# Patient Record
Sex: FEMALE | Race: ASIAN | NOT HISPANIC OR LATINO | Employment: FULL TIME | URBAN - METROPOLITAN AREA
[De-identification: names, ages, dates, MRNs, and addresses within clinical notes are randomized per-mention and may not be internally consistent; named-entity substitution may affect disease eponyms.]

---

## 2017-01-09 ENCOUNTER — GYNECOLOGY VISIT (OUTPATIENT)
Dept: OBGYN | Facility: MEDICAL CENTER | Age: 27
End: 2017-01-09
Payer: COMMERCIAL

## 2017-01-09 VITALS — WEIGHT: 201 LBS | BODY MASS INDEX: 40.52 KG/M2 | HEIGHT: 59 IN

## 2017-01-09 DIAGNOSIS — N93.8 DUB (DYSFUNCTIONAL UTERINE BLEEDING): ICD-10-CM

## 2017-01-09 LAB — IN CLINIC OB SCAN: NORMAL

## 2017-01-09 PROCEDURE — 99203 OFFICE O/P NEW LOW 30 MIN: CPT | Mod: 25 | Performed by: OBSTETRICS & GYNECOLOGY

## 2017-01-09 PROCEDURE — 76830 TRANSVAGINAL US NON-OB: CPT | Performed by: OBSTETRICS & GYNECOLOGY

## 2017-01-09 NOTE — MR AVS SNAPSHOT
"Nicole Turner   2017 10:00 AM   Gynecology Visit   MRN: [id number]    Department:  [facility]   Dept Phone:  [phone]    Description:  Female : 1990   Provider:  Hafsa Charles M.D.           Reason for Visit     Other DUB      Allergies as of 2017     No Known Allergies      Vital Signs     Height Weight Body Mass Index Last Menstrual Period Breastfeeding? Smoking Status    1.499 m (4' 11\") 91.173 kg (201 lb) 40.58 kg/m2 10/31/2016 Unknown Former Smoker      Basic Information     Date Of Birth Sex Race Ethnicity Preferred Language    1990 Female Unknown Non- English      Your appointments     2017 11:45 AM   New OB with Hafsa Charles M.D.   [facility]    [address]  [facility] [id number] [phone]              Health Maintenance        Date Due Completion Dates    IMM HEP B VACCINE (1 of 3 - Primary Series) 1990 ---    IMM HEP A VACCINE (1 of 2 - Standard Series) 10/2/1992 ---    IMM HPV VACCINE (1 of 3 - Female 3 Dose Series) 10/2/2002 ---    IMM VARICELLA (CHICKENPOX) VACCINE (1 of 2 - 2 Dose Adolescent Series) 10/2/2004 ---    IMM DTaP/Tdap/Td Vaccine (1 - Tdap) 10/2/2010 ---    PAP SMEAR 10/2/2012 ---    IMM INFLUENZA (1) 2016 10/26/2015            Current Immunizations     Influenza Vaccine Quad Inj (Preserved) 10/26/2015      Below and/or attached are the medications your provider expects you to take. Review all of your home medications and newly ordered medications with your provider and/or pharmacist. Follow medication instructions as directed by your provider and/or pharmacist. Please keep your medication list with you and share with your provider. Update the information when medications are discontinued, doses are changed, or new medications (including over-the-counter products) are added; and carry medication information at all times in the event of " emergency situations     Allergies:  No Known Allergies          Medications  Valid as of: January 09, 2017 - 10:18 AM    Generic Name Brand Name Tablet Size Instructions for use    Metoclopramide HCl (Tab) REGLAN 10 MG Take 1 Tab by mouth every 6 hours as needed (nausea).        .                 Medicines prescribed today were sent to:     None      Medication refill instructions:       If your prescription bottle indicates you have medication refills left, it is not necessary to call your provider’s office. Please contact your pharmacy and they will refill your medication.    If your prescription bottle indicates you do not have any refills left, you may request refills at any time through one of the following ways: The online [employer] system (except Urgent Care), by calling your provider’s office, or by asking your pharmacy to contact your provider’s office with a refill request. Medication refills are processed only during regular business hours and may not be available until the next business day. Your provider may request additional information or to have a follow-up visit with you prior to refilling your medication.   *Please Note: Medication refills are assigned a new Rx number when refilled electronically. Your pharmacy may indicate that no refills were authorized even though a new prescription for the same medication is available at the pharmacy. Please request the medicine by name with the pharmacy before contacting your provider for a refill.           [employer] Access Code: Activation code not generated  Current [employer] Status: Active

## 2017-01-09 NOTE — PROGRESS NOTES
"Chief Complaint   Patient presents with   • Other     DUB     History of present illness:   26 y.o.  10 weeks by LMP presents for confirmation of pregnancy  Some nausea, no vomiting  No vaginal bleeding, no cramping    Review of systems:  Pertinent positives documented in HPI and all other systems reviewed & are negative    All PMH, PSH, allergies, social history and FH reviewed and updated today:  Past Medical History   Diagnosis Date   • Migraine    • H/O bladder infections 2016       History reviewed. No pertinent past surgical history.    Allergies: No Known Allergies    Social History     Social History   • Marital Status: Single     Spouse Name: N/A   • Number of Children: N/A   • Years of Education: N/A     Occupational History   • Not on file.     Social History Main Topics   • Smoking status: Former Smoker   • Smokeless tobacco: Not on file   • Alcohol Use: No   • Drug Use: No   • Sexual Activity:     Partners: Male     Birth Control/ Protection: Condom     Other Topics Concern   • Not on file     Social History Narrative       Family History   Problem Relation Age of Onset   • Hypertension Mother    • Diabetes Mother    • Other Mother      gout   • Hypertension Father    • Diabetes Father    • Other Father      gout   • Heart Disease Maternal Grandmother      Physical exam:  Height 1.499 m (4' 11\"), weight 91.173 kg (201 lb), last menstrual period 10/31/2016, unknown if currently breastfeeding.    General:appears stated age, is in no apparent distress, is well developed and well nourished  Skin: No rashes, or ulcers or lesions seen  Psychiatric: Patient shows appropriate affect, is alert and oriented x3, intact judgment and insight.    1. DUB (dysfunctional uterine bleeding)  POCT IN CLINIC OB SCAN   2.  As per my read: TVS shows a single live IUP 10 weeks by CRL (+) cardiac activity, YS seen. ART 2017. normal cervix, normal ovaries. no FF in the CDS  3. Continue PNV   4. Nutrition in pregnancy " discussed. N/V  5. Hydrate  6. SAB precautions  7. 1st trimester screening with Dr Ybarra  8. New OB 4 weeks

## 2017-01-31 ENCOUNTER — HOSPITAL ENCOUNTER (OUTPATIENT)
Dept: LAB | Facility: MEDICAL CENTER | Age: 27
End: 2017-01-31
Attending: OBSTETRICS & GYNECOLOGY
Payer: COMMERCIAL

## 2017-01-31 LAB
ABO GROUP BLD: NORMAL
APPEARANCE UR: ABNORMAL
BACTERIA #/AREA URNS HPF: ABNORMAL /HPF
BASOPHILS # BLD AUTO: 0.05 K/UL (ref 0–0.12)
BASOPHILS NFR BLD AUTO: 0.5 % (ref 0–1.8)
BILIRUB UR QL STRIP.AUTO: NEGATIVE
BLD GP AB SCN SERPL QL: NORMAL
CA OXALATE CRYSTAL  1765: ABNORMAL /HPF
COLOR UR AUTO: YELLOW
CULTURE IF INDICATED INDCX: YES UA CULTURE
EOSINOPHIL # BLD: 0.06 K/UL (ref 0–0.51)
EOSINOPHIL NFR BLD AUTO: 0.6 % (ref 0–6.9)
EPITHELIAL CELLS 1715: ABNORMAL /HPF
ERYTHROCYTE [DISTWIDTH] IN BLOOD BY AUTOMATED COUNT: 47.3 FL (ref 35.9–50)
GLUCOSE UR STRIP.AUTO-MCNC: NEGATIVE MG/DL
HBV SURFACE AG SERPL QL IA: NEGATIVE
HCT VFR BLD AUTO: 41.6 % (ref 37–47)
HGB BLD-MCNC: 13.3 G/DL (ref 12–16)
HIV 1+2 AB+HIV1 P24 AG SERPL QL IA: NON REACTIVE
IMM GRANULOCYTES # BLD AUTO: 0.03 K/UL (ref 0–0.11)
IMM GRANULOCYTES NFR BLD AUTO: 0.3 % (ref 0–0.9)
KETONES UR STRIP.AUTO-MCNC: NEGATIVE MG/DL
LEUKOCYTE ESTERASE UR QL STRIP.AUTO: ABNORMAL
LYMPHOCYTES # BLD: 1.96 K/UL (ref 1–4.8)
LYMPHOCYTES NFR BLD AUTO: 18.4 % (ref 22–41)
MCH RBC QN AUTO: 29.8 PG (ref 27–33)
MCHC RBC AUTO-ENTMCNC: 32 G/DL (ref 33.6–35)
MCV RBC AUTO: 93.1 FL (ref 81.4–97.8)
MICRO URNS: ABNORMAL
MONOCYTES # BLD: 0.58 K/UL (ref 0–0.85)
MONOCYTES NFR BLD AUTO: 5.5 % (ref 0–13.4)
MUCOUS THREADS URNS QL MICRO: ABNORMAL /HPF
NEUTROPHILS # BLD: 7.96 K/UL (ref 2–7.15)
NEUTROPHILS NFR BLD AUTO: 74.7 % (ref 44–72)
NITRITE UR QL STRIP.AUTO: NEGATIVE
NRBC # BLD AUTO: 0 K/UL
NRBC BLD-RTO: 0 /100 WBC
PH UR: 6.5 [PH]
PLATELET # BLD AUTO: 359 K/UL (ref 164–446)
PMV BLD AUTO: 9.8 FL (ref 9–12.9)
PROT UR QL STRIP: NEGATIVE MG/DL
RBC # BLD AUTO: 4.47 M/UL (ref 4.2–5.4)
RBC #/AREA URNS HPF: ABNORMAL /HPF
RBC UR QL AUTO: NEGATIVE
RH BLD: NORMAL
RUBV IGG SERPL IA-ACNC: 284.5 IU/ML
SP GR UR STRIP.AUTO: 1.02
TREPONEMA PALLIDUM IGG+IGM AB [PRESENCE] IN SERUM OR PLASMA BY IMMUNOASSAY: NON REACTIVE
WBC # BLD AUTO: 10.6 K/UL (ref 4.8–10.8)
WBC #/AREA URNS HPF: ABNORMAL /HPF

## 2017-01-31 PROCEDURE — 87086 URINE CULTURE/COLONY COUNT: CPT

## 2017-01-31 PROCEDURE — 84702 CHORIONIC GONADOTROPIN TEST: CPT

## 2017-01-31 PROCEDURE — 85025 COMPLETE CBC W/AUTO DIFF WBC: CPT

## 2017-01-31 PROCEDURE — 81001 URINALYSIS AUTO W/SCOPE: CPT

## 2017-01-31 PROCEDURE — 86901 BLOOD TYPING SEROLOGIC RH(D): CPT

## 2017-01-31 PROCEDURE — 86900 BLOOD TYPING SEROLOGIC ABO: CPT

## 2017-01-31 PROCEDURE — 86780 TREPONEMA PALLIDUM: CPT

## 2017-01-31 PROCEDURE — 87389 HIV-1 AG W/HIV-1&-2 AB AG IA: CPT

## 2017-01-31 PROCEDURE — 86762 RUBELLA ANTIBODY: CPT

## 2017-01-31 PROCEDURE — 87340 HEPATITIS B SURFACE AG IA: CPT

## 2017-01-31 PROCEDURE — 86850 RBC ANTIBODY SCREEN: CPT

## 2017-01-31 PROCEDURE — 84163 PAPPA SERUM: CPT

## 2017-02-02 LAB
BACTERIA UR CULT: NORMAL
SIGNIFICANT IND 70042: NORMAL
SOURCE SOURCE: NORMAL

## 2017-02-03 ENCOUNTER — HOSPITAL ENCOUNTER (OUTPATIENT)
Facility: MEDICAL CENTER | Age: 27
End: 2017-02-03
Attending: OBSTETRICS & GYNECOLOGY
Payer: COMMERCIAL

## 2017-02-03 ENCOUNTER — INITIAL PRENATAL (OUTPATIENT)
Dept: OBGYN | Facility: MEDICAL CENTER | Age: 27
End: 2017-02-03
Payer: COMMERCIAL

## 2017-02-03 VITALS
WEIGHT: 198 LBS | HEIGHT: 59 IN | DIASTOLIC BLOOD PRESSURE: 76 MMHG | SYSTOLIC BLOOD PRESSURE: 100 MMHG | BODY MASS INDEX: 39.92 KG/M2

## 2017-02-03 DIAGNOSIS — Z34.01 ENCOUNTER FOR SUPERVISION OF NORMAL FIRST PREGNANCY IN FIRST TRIMESTER: ICD-10-CM

## 2017-02-03 DIAGNOSIS — R63.8 INCREASED BMI: ICD-10-CM

## 2017-02-03 PROBLEM — Z34.91 ENCOUNTER FOR SUPERVISION OF NORMAL PREGNANCY IN FIRST TRIMESTER: Status: ACTIVE | Noted: 2017-02-03

## 2017-02-03 LAB
# FETUSES US: 1
AGE AT DELIVERY: 25.9 YEARS
COMMENT  Z4564: NORMAL
FET CRL US.MEAS: 67 MM
FET NUCHAL FOLD MOM THICKNESS US.MEAS: 1.12
FET NUCHAL FOLD THICKNESS US.MEAS: 1.9 MM
FET TS 18 RISK FROM MAT AGE: NORMAL
FET TS 21 RISK FROM MAT AGE: NORMAL
GA: 13 WEEKS
HCG ADJ MOM SERPL: 1.08
HCG SERPL-ACNC: 76.5 IU/ML
INTEGRATED SCN PATIENT-IMP: NORMAL
NOTE Z4565: NORMAL
PAPP-A MOM SERPL: 0.64
PAPP-A SERPL-MCNC: 539.7 NG/ML
RESULTS Z4535: NORMAL
SONOGRAPHER: NORMAL
SUBMIT PART2 SAMPLE USING Z4537: NORMAL
TS 18 RISK FETUS: NORMAL
TS 21 RISK FETUS: NORMAL
US DATE: NORMAL

## 2017-02-03 PROCEDURE — 87491 CHLMYD TRACH DNA AMP PROBE: CPT

## 2017-02-03 PROCEDURE — 88175 CYTOPATH C/V AUTO FLUID REDO: CPT

## 2017-02-03 PROCEDURE — 59401 PR NEW OB VISIT: CPT | Performed by: OBSTETRICS & GYNECOLOGY

## 2017-02-03 PROCEDURE — 87591 N.GONORRHOEAE DNA AMP PROB: CPT

## 2017-02-03 NOTE — PROGRESS NOTES
CC: NEW OB VISIT    History of present illness:   26 y.o.  13W4D by LMP + 1st trimester US presents for New OB.  Doing well  No VB, no cramping  No nausea no vomiting    Review of systems:  Pertinent positives documented in HPI and all other systems reviewed & are negative    All PMH, PSH, allergies, social history and FH reviewed and updated today:  Past Medical History   Diagnosis Date   • Migraine    • H/O bladder infections 2016       No past surgical history on file.    Allergies: No Known Allergies    Social History     Social History   • Marital Status: Single     Spouse Name: N/A   • Number of Children: N/A   • Years of Education: N/A     Occupational History   • Not on file.     Social History Main Topics   • Smoking status: Former Smoker   • Smokeless tobacco: Not on file   • Alcohol Use: No   • Drug Use: No   • Sexual Activity:     Partners: Male     Birth Control/ Protection: Condom     Other Topics Concern   • Not on file     Social History Narrative       Family History   Problem Relation Age of Onset   • Hypertension Mother    • Diabetes Mother    • Other Mother      gout   • Hypertension Father    • Diabetes Father    • Other Father      gout   • Heart Disease Maternal Grandmother      Physical exam:  Last menstrual period 10/31/2016, unknown if currently breastfeeding.  SEE FULL PE  General:appears stated age, is in no apparent distress, is well developed and well nourished  Skin: No rashes, or ulcers or lesions seen  Psychiatric: Patient shows appropriate affect, is alert and oriented x3, intact judgment and insight.  1. Encounter for supervision of normal first pregnancy in first trimester     2. Increased BMI - 40.6     3. Continue PNV + FA  4. Hydrate  5. PNL done  6. Seen Dr Ybarra for 1st trimester screening  7. PAP collected  8. OB ff-up 4 weeks

## 2017-02-03 NOTE — MR AVS SNAPSHOT
Nicole Turner   2/3/2017 11:45 AM   Initial Prenatal   MRN: [id number]    Department:  [facility]   Dept Phone:  [phone]    Description:  Female : 1990   Provider:  Hafsa Charles M.D.           Allergies as of 2/3/2017     No Known Allergies      You were diagnosed with     Encounter for supervision of other normal pregnancy in first trimester   [6100464]         Vital Signs     Last Menstrual Period Smoking Status                10/31/2016 Former Smoker          Basic Information     Date Of Birth Sex Race Ethnicity Preferred Language    1990 Female Unknown Non- English      Your appointments     2017 11:45 AM   New OB with Hafsa Charles M.D.   [facility]    [address]  [place] [address]   [phone]            Mar 01, 2017  9:45 AM   OB Follow Up with Hafsa Charles M.D.   [facility]    [address]  [place] [address]   [phone]              Health Maintenance        Date Due Completion Dates    IMM HEP B VACCINE (1 of 3 - Primary Series) 1990 ---    IMM HEP A VACCINE (1 of 2 - Standard Series) 10/2/1992 ---    IMM HPV VACCINE (1 of 3 - Female 3 Dose Series) 10/2/2002 ---    IMM VARICELLA (CHICKENPOX) VACCINE (1 of 2 - 2 Dose Adolescent Series) 10/2/2004 ---    IMM DTaP/Tdap/Td Vaccine (1 - Tdap) 10/2/2010 ---    PAP SMEAR 10/2/2012 ---    IMM INFLUENZA (1) 2016 10/26/2015            Current Immunizations     Influenza Vaccine Quad Inj (Preserved) 10/26/2015      Below and/or attached are the medications your provider expects you to take. Review all of your home medications and newly ordered medications with your provider and/or pharmacist. Follow medication instructions as directed by your provider and/or pharmacist. Please keep your medication list with you and share with your  provider. Update the information when medications are discontinued, doses are changed, or new medications (including over-the-counter products) are added; and carry medication information at all times in the event of emergency situations     Allergies:  No Known Allergies          Medications  Valid as of: February 03, 2017 -  9:58 AM    Generic Name Brand Name Tablet Size Instructions for use    Metoclopramide HCl (Tab) REGLAN 10 MG Take 1 Tab by mouth every 6 hours as needed (nausea).        .                 Medicines prescribed today were sent to:     None      Medication refill instructions:       If your prescription bottle indicates you have medication refills left, it is not necessary to call your provider’s office. Please contact your pharmacy and they will refill your medication.    If your prescription bottle indicates you do not have any refills left, you may request refills at any time through one of the following ways: The online [employer] system (except Urgent Care), by calling your provider’s office, or by asking your pharmacy to contact your provider’s office with a refill request. Medication refills are processed only during regular business hours and may not be available until the next business day. Your provider may request additional information or to have a follow-up visit with you prior to refilling your medication.   *Please Note: Medication refills are assigned a new Rx number when refilled electronically. Your pharmacy may indicate that no refills were authorized even though a new prescription for the same medication is available at the pharmacy. Please request the medicine by name with the pharmacy before contacting your provider for a refill.        Your To Do List     Future Labs/Procedures Complete By Expires    THINPREP RFLX HPV ASCUS W/CTNG  As directed 2/3/2018         [employer] Access Code: Activation code not generated  Current [employer] Status: Active

## 2017-02-04 LAB
C TRACH DNA GENITAL QL NAA+PROBE: POSITIVE
CYTOLOGY REG CYTOL: ABNORMAL
N GONORRHOEA DNA GENITAL QL NAA+PROBE: NEGATIVE
SPECIMEN SOURCE: ABNORMAL

## 2017-02-08 DIAGNOSIS — A74.9 CHLAMYDIA INFECTION AFFECTING PREGNANCY IN SECOND TRIMESTER: ICD-10-CM

## 2017-02-08 DIAGNOSIS — O98.812 CHLAMYDIA INFECTION AFFECTING PREGNANCY IN SECOND TRIMESTER: ICD-10-CM

## 2017-02-08 RX ORDER — AZITHROMYCIN 500 MG/1
1000 TABLET, FILM COATED ORAL DAILY
Qty: 2 TAB | Refills: 0 | Status: ON HOLD | OUTPATIENT
Start: 2017-02-08 | End: 2017-08-04

## 2017-02-08 NOTE — TELEPHONE ENCOUNTER
----- Message from Hafsa Charles M.D. sent at 2/6/2017 10:31 PM PST -----  Azithromycin 1 gm PO x 1. SHELDON 3 weeks  Treatment of partner through [facility]

## 2017-02-08 NOTE — TELEPHONE ENCOUNTER
Patient was notified about positive chlamydia. Rx sent to pharmacy for patient.  Advised to avoid sexual intercourse for 1 week after treatment. Advised that partner needs to be treated as well. HD form/result were faxed.    Pended phone in medication   Routed to Dr Igor Pereyra

## 2017-03-01 ENCOUNTER — ROUTINE PRENATAL (OUTPATIENT)
Dept: OBGYN | Facility: MEDICAL CENTER | Age: 27
End: 2017-03-01
Payer: COMMERCIAL

## 2017-03-01 VITALS — DIASTOLIC BLOOD PRESSURE: 70 MMHG | WEIGHT: 200 LBS | SYSTOLIC BLOOD PRESSURE: 120 MMHG | BODY MASS INDEX: 40.37 KG/M2

## 2017-03-01 DIAGNOSIS — Z34.02 SUPERVISION OF NORMAL FIRST PREGNANCY IN SECOND TRIMESTER: ICD-10-CM

## 2017-03-01 DIAGNOSIS — A74.9 CHLAMYDIA INFECTION: ICD-10-CM

## 2017-03-01 PROCEDURE — 90040 PR PRENATAL FOLLOW UP: CPT | Performed by: OBSTETRICS & GYNECOLOGY

## 2017-03-01 NOTE — MR AVS SNAPSHOT
Nicole Turner   3/1/2017 9:45 AM   Routine Prenatal   MRN: [id number]    Department:  [facility]   Dept Phone:  [phone]    Description:  Female : 1990   Provider:  Hafsa Charles M.D.           Allergies as of 3/1/2017     No Known Allergies      Vital Signs     Blood Pressure Weight Last Menstrual Period Smoking Status          120/70 mmHg 90.719 kg (200 lb) 10/31/2016 Former Smoker        Basic Information     Date Of Birth Sex Race Ethnicity Preferred Language    1990 Female Unknown Non- English      Your appointments     Mar 24, 2017 10:15 AM   OB Follow Up with Hafsa Charles M.D.   [facility]    [address]  [place] [address]   [phone]            2017  9:45 AM   OB Follow Up with Hafsa Charles M.D.   [facility]    [address]  [place] [address]   [phone]            May 05, 2017  9:45 AM   OB Follow Up with Hafsa Charles M.D.   [facility]    [address]  [place] [address]   [phone]            2017 10:00 AM   OB Follow Up with Hafsa Charles M.D.   [facility])    [address]  [place] [address]   [phone]            2017 10:30 AM   OB Follow Up with Hafsa Charles M.D.   [facility]    [address]  [place] [address]   [phone]            2017  9:00 AM   OB Follow Up with Hafsa Charles M.D.   [facility])    [address]  [place] [address]   [phone]              Problem List              ICD-10-CM Priority Class Noted - Resolved    Encounter for  supervision of normal first pregnancy in first trimester Z34.01   2/3/2017 - Present    Increased BMI - 40.6 R63.8   2/3/2017 - Present      Health Maintenance        Date Due Completion Dates    IMM HEP B VACCINE (1 of 3 - Primary Series) 1990 ---    IMM HEP A VACCINE (1 of 2 - Standard Series) 10/2/1992 ---    IMM HPV VACCINE (1 of 3 - Female 3 Dose Series) 10/2/2002 ---    IMM VARICELLA (CHICKENPOX) VACCINE (1 of 2 - 2 Dose Adolescent Series) 10/2/2004 ---    IMM DTaP/Tdap/Td Vaccine (1 - Tdap) 10/2/2010 ---    IMM INFLUENZA (1) 9/1/2016 10/26/2015    PAP SMEAR 2/3/2020 2/3/2017            Current Immunizations     Influenza Vaccine Quad Inj (Preserved) 10/26/2015      Below and/or attached are the medications your provider expects you to take. Review all of your home medications and newly ordered medications with your provider and/or pharmacist. Follow medication instructions as directed by your provider and/or pharmacist. Please keep your medication list with you and share with your provider. Update the information when medications are discontinued, doses are changed, or new medications (including over-the-counter products) are added; and carry medication information at all times in the event of emergency situations     Allergies:  No Known Allergies          Medications  Valid as of: March 01, 2017 - 10:16 AM    Generic Name Brand Name Tablet Size Instructions for use    Azithromycin (Tab) ZITHROMAX 500 MG Take 2 Tabs by mouth every day.        Metoclopramide HCl (Tab) REGLAN 10 MG Take 1 Tab by mouth every 6 hours as needed (nausea).        .                 Medicines prescribed today were sent to:     [facility] [address] [place] [address] (), [place] [address]    [address] [place] [address] () [place] [address]    Phone: [phone] Fax: [phone]    Open 24 Hours?: No      Medication refill instructions:       If your prescription bottle indicates you have medication refills left, it is not necessary to  call your provider’s office. Please contact your pharmacy and they will refill your medication.    If your prescription bottle indicates you do not have any refills left, you may request refills at any time through one of the following ways: The online [employer] system (except Urgent Care), by calling your provider’s office, or by asking your pharmacy to contact your provider’s office with a refill request. Medication refills are processed only during regular business hours and may not be available until the next business day. Your provider may request additional information or to have a follow-up visit with you prior to refilling your medication.   *Please Note: Medication refills are assigned a new Rx number when refilled electronically. Your pharmacy may indicate that no refills were authorized even though a new prescription for the same medication is available at the pharmacy. Please request the medicine by name with the pharmacy before contacting your provider for a refill.           [employer] Access Code: Activation code not generated  Current [employer] Status: Active

## 2017-03-23 ENCOUNTER — HOSPITAL ENCOUNTER (OUTPATIENT)
Dept: LAB | Facility: MEDICAL CENTER | Age: 27
End: 2017-03-23
Attending: OBSTETRICS & GYNECOLOGY
Payer: COMMERCIAL

## 2017-03-23 PROCEDURE — 81511 FTL CGEN ABNOR FOUR ANAL: CPT

## 2017-03-23 PROCEDURE — 36415 COLL VENOUS BLD VENIPUNCTURE: CPT

## 2017-03-25 LAB
# FETUSES US: NORMAL
AFP MOM SERPL: 1.66
AFP SERPL-MCNC: 83 NG/ML
AGE - REPORTED: 25.8 YR
GA METHOD: NORMAL
GA: 20.43 WEEKS
HCG MOM SERPL: 1.69
HCG SERPL-ACNC: NORMAL IU/L
IDDM PATIENT QL: NO
INHIBIN A MOM SERPL: 0.84
INHIBIN A SERPL-MCNC: 138 PG/ML
INTEGRATED SCN PATIENT-IMP: NORMAL
PATHOLOGY STUDY: NORMAL
U ESTRIOL MOM SERPL: 1.01
U ESTRIOL SERPL-MCNC: 2.11 NG/ML

## 2017-03-31 ENCOUNTER — TELEPHONE (OUTPATIENT)
Dept: OBGYN | Facility: MEDICAL CENTER | Age: 27
End: 2017-03-31

## 2017-04-04 ENCOUNTER — TELEPHONE (OUTPATIENT)
Dept: OBGYN | Facility: MEDICAL CENTER | Age: 27
End: 2017-04-04

## 2017-04-04 DIAGNOSIS — Z34.82 ENCOUNTER FOR SUPERVISION OF OTHER NORMAL PREGNANCY IN SECOND TRIMESTER: ICD-10-CM

## 2017-04-04 RX ORDER — ASCORBIC ACID, CALCIUM CITRATE, IRON, CHOLECALCIFEROL, PYRIDOXINE HYDROCHLORIDE, AND FOLIC ACID 20-1-25 MG
3 KIT ORAL DAILY
Qty: 30 EACH | Refills: 4 | Status: ON HOLD | OUTPATIENT
Start: 2017-04-04 | End: 2017-08-08

## 2017-04-14 ENCOUNTER — ROUTINE PRENATAL (OUTPATIENT)
Dept: OBGYN | Facility: MEDICAL CENTER | Age: 27
End: 2017-04-14
Payer: COMMERCIAL

## 2017-04-14 ENCOUNTER — HOSPITAL ENCOUNTER (OUTPATIENT)
Facility: MEDICAL CENTER | Age: 27
End: 2017-04-14
Attending: OBSTETRICS & GYNECOLOGY
Payer: COMMERCIAL

## 2017-04-14 VITALS — DIASTOLIC BLOOD PRESSURE: 70 MMHG | BODY MASS INDEX: 40.78 KG/M2 | WEIGHT: 202 LBS | SYSTOLIC BLOOD PRESSURE: 110 MMHG

## 2017-04-14 DIAGNOSIS — A74.9 CHLAMYDIA: ICD-10-CM

## 2017-04-14 DIAGNOSIS — Z34.01 ENCOUNTER FOR SUPERVISION OF NORMAL FIRST PREGNANCY IN FIRST TRIMESTER: ICD-10-CM

## 2017-04-14 PROCEDURE — 87591 N.GONORRHOEAE DNA AMP PROB: CPT

## 2017-04-14 PROCEDURE — 87491 CHLMYD TRACH DNA AMP PROBE: CPT

## 2017-04-14 PROCEDURE — 90040 PR PRENATAL FOLLOW UP: CPT | Performed by: OBSTETRICS & GYNECOLOGY

## 2017-04-14 NOTE — PROGRESS NOTES
Patient is at 23w4d. Doing well. (+) fetal movement, no contractions, no LOF, no VB  Patients' weight gain, fluid intake and exercise level discussed.Vitals, fundal height , fetal position, and FHR reviewed on flowsheet.    .../70 mmHg  Wt 91.627 kg (202 lb)  LMP 10/31/2016  Past Medical History   Diagnosis Date   • Migraine    • H/O bladder infections      Patient Active Problem List    Diagnosis Date Noted   • Chlamydia infection- s/p tx- SHELDON next visit 2017   • Encounter for supervision of normal first pregnancy in first trimester 2017   • Increased BMI - 40.6 2017     Lab:No results found for this or any previous visit (from the past 336 hour(s)).    Assessment: 26 year old   1  23w4d  2. Doing well  3. Size equals Dates and/or Scan  4. Weight gain: normal: Yes, excessive:No                  Plan:  1. Rediscuss diet.  2. Increase water intake   3. Continue vitamins.  4. 1 hour glucola ordered for 24-28 weeks  5. Encouraged L&D classes  6. OB ff-up visit 4 weeks  7. Chlamydia SHELDON done

## 2017-04-14 NOTE — MR AVS SNAPSHOT
Nicole Turner   2017 9:45 AM   Routine Prenatal   MRN: [id number]    Department:  [facility]   Dept Phone:  [phone]    Description:  Female : 1990   Provider:  Hafsa Charles M.D.           Allergies as of 2017     No Known Allergies      You were diagnosed with     Chlamydia   [[id number]]       Encounter for supervision of normal first pregnancy in first trimester   [851403]         Vital Signs     Blood Pressure Weight Last Menstrual Period Smoking Status          110/70 mmHg 91.627 kg (202 lb) 10/31/2016 Former Smoker        Basic Information     Date Of Birth Sex Race Ethnicity Preferred Language    1990 Female Unknown Non- English      Your appointments     May 19, 2017 10:45 AM   OB Follow Up with Hafsa Charles M.D.   [facility]    [address]  [place] [address]   [phone]            2017 10:00 AM   OB Follow Up with Hafsa Charles M.D.   [facility]    [address]  [place] [address]   [phone]            2017 10:30 AM   OB Follow Up with Hafsa Charles M.D.   [facility]    [address]  [place] [address]   [phone]            2017  9:00 AM   OB Follow Up with Hafsa Charles M.D.   [facility]    [address]  [place] [address]   [phone]              Problem List              ICD-10-CM Priority Class Noted - Resolved    Encounter for supervision of normal first pregnancy in first trimester Z34.01   2/3/2017 - Present    Increased BMI - 40.6 R63.8   2/3/2017 - Present    Chlamydia infection- s/p tx- SHELDON next visit A74.9   3/1/2017 - Present      Health Maintenance        Date Due Completion Dates    IMM HEP B  VACCINE (1 of 3 - Primary Series) 1990 ---    IMM HEP A VACCINE (1 of 2 - Standard Series) 10/2/1992 ---    IMM HPV VACCINE (1 of 3 - Female 3 Dose Series) 10/2/2002 ---    IMM VARICELLA (CHICKENPOX) VACCINE (1 of 2 - 2 Dose Adolescent Series) 10/2/2004 ---    IMM DTaP/Tdap/Td Vaccine (1 - Tdap) 10/2/2010 ---    PAP SMEAR 2/3/2020 2/3/2017            Current Immunizations     Influenza Vaccine Quad Inj (Preserved) 10/26/2015      Below and/or attached are the medications your provider expects you to take. Review all of your home medications and newly ordered medications with your provider and/or pharmacist. Follow medication instructions as directed by your provider and/or pharmacist. Please keep your medication list with you and share with your provider. Update the information when medications are discontinued, doses are changed, or new medications (including over-the-counter products) are added; and carry medication information at all times in the event of emergency situations     Allergies:  No Known Allergies          Medications  Valid as of: April 14, 2017 - 10:29 AM    Generic Name Brand Name Tablet Size Instructions for use    Azithromycin (Tab) ZITHROMAX 500 MG Take 2 Tabs by mouth every day.        Metoclopramide HCl (Tab) REGLAN 10 MG Take 1 Tab by mouth every 6 hours as needed (nausea).        Prenat w/o A [id number] &B6 (Misc) CITRANATAL B-CALM 20-1 & 25 (2) MG Take 3 tablet by mouth every day.        .                 Medicines prescribed today were sent to:     [facility] [address] [place] [address] (), [place] - [address]    [address] () [place] [address]    Phone: [phone] Fax: [phone]    Open 24 Hours?: No      Medication refill instructions:       If your prescription bottle indicates you have medication refills left, it is not necessary to call your provider’s office. Please contact your pharmacy and they will refill your medication.    If your prescription bottle  indicates you do not have any refills left, you may request refills at any time through one of the following ways: The online [employer] system (except Urgent Care), by calling your provider’s office, or by asking your pharmacy to contact your provider’s office with a refill request. Medication refills are processed only during regular business hours and may not be available until the next business day. Your provider may request additional information or to have a follow-up visit with you prior to refilling your medication.   *Please Note: Medication refills are assigned a new Rx number when refilled electronically. Your pharmacy may indicate that no refills were authorized even though a new prescription for the same medication is available at the pharmacy. Please request the medicine by name with the pharmacy before contacting your provider for a refill.        Your To Do List     Future Labs/Procedures Complete By Expires    CHLAMYDIA/GC PCR URINE OR SWAB  As directed 4/14/2018    T.PALLIDUM AB EIA  As directed 4/14/2018         [employer] Access Code: Activation code not generated  Current [employer] Status: Active

## 2017-04-16 LAB
C TRACH DNA SPEC QL NAA+PROBE: NEGATIVE
N GONORRHOEA DNA SPEC QL NAA+PROBE: NEGATIVE
SPECIMEN SOURCE: NORMAL

## 2017-04-28 ENCOUNTER — HOSPITAL ENCOUNTER (OUTPATIENT)
Dept: LAB | Facility: MEDICAL CENTER | Age: 27
End: 2017-04-28
Attending: INTERNAL MEDICINE
Payer: COMMERCIAL

## 2017-04-28 DIAGNOSIS — Z34.01 ENCOUNTER FOR SUPERVISION OF NORMAL FIRST PREGNANCY IN FIRST TRIMESTER: ICD-10-CM

## 2017-04-28 LAB
GLUCOSE 1H P 50 G GLC PO SERPL-MCNC: 161 MG/DL (ref 70–139)
HCT VFR BLD AUTO: 36.7 % (ref 37–47)
HGB BLD-MCNC: 11.8 G/DL (ref 12–16)
TREPONEMA PALLIDUM IGG+IGM AB [PRESENCE] IN SERUM OR PLASMA BY IMMUNOASSAY: NON REACTIVE

## 2017-04-28 PROCEDURE — 36415 COLL VENOUS BLD VENIPUNCTURE: CPT

## 2017-04-28 PROCEDURE — 82950 GLUCOSE TEST: CPT

## 2017-04-28 PROCEDURE — 86780 TREPONEMA PALLIDUM: CPT

## 2017-04-28 PROCEDURE — 85018 HEMOGLOBIN: CPT

## 2017-04-28 PROCEDURE — 85014 HEMATOCRIT: CPT

## 2017-05-01 DIAGNOSIS — R73.09 ELEVATED GLUCOSE: ICD-10-CM

## 2017-05-09 ENCOUNTER — HOSPITAL ENCOUNTER (OUTPATIENT)
Dept: LAB | Facility: MEDICAL CENTER | Age: 27
End: 2017-05-09
Attending: OBSTETRICS & GYNECOLOGY
Payer: COMMERCIAL

## 2017-05-09 DIAGNOSIS — R73.09 ELEVATED GLUCOSE: ICD-10-CM

## 2017-05-09 LAB
GLUCOSE 1H P CHAL SERPL-MCNC: 121 MG/DL (ref 65–180)
GLUCOSE 2H P CHAL SERPL-MCNC: 88 MG/DL (ref 65–155)
GLUCOSE 3H P CHAL SERPL-MCNC: 60 MG/DL (ref 65–140)
GLUCOSE BS SERPL-MCNC: 79 MG/DL (ref 65–95)

## 2017-05-09 PROCEDURE — 82952 GTT-ADDED SAMPLES: CPT

## 2017-05-09 PROCEDURE — 36415 COLL VENOUS BLD VENIPUNCTURE: CPT

## 2017-05-09 PROCEDURE — 82951 GLUCOSE TOLERANCE TEST (GTT): CPT

## 2017-05-16 ENCOUNTER — APPOINTMENT (OUTPATIENT)
Dept: PEDIATRICS | Facility: PHYSICIAN GROUP | Age: 27
End: 2017-05-16

## 2017-05-30 ENCOUNTER — ROUTINE PRENATAL (OUTPATIENT)
Dept: OBGYN | Facility: CLINIC | Age: 27
End: 2017-05-30
Payer: COMMERCIAL

## 2017-05-30 VITALS
DIASTOLIC BLOOD PRESSURE: 62 MMHG | HEIGHT: 59 IN | BODY MASS INDEX: 43.46 KG/M2 | WEIGHT: 215.6 LBS | SYSTOLIC BLOOD PRESSURE: 104 MMHG

## 2017-05-30 DIAGNOSIS — Z34.03 ENCOUNTER FOR SUPERVISION OF NORMAL FIRST PREGNANCY IN THIRD TRIMESTER: ICD-10-CM

## 2017-05-30 PROCEDURE — 90040 PR PRENATAL FOLLOW UP: CPT | Performed by: OBSTETRICS & GYNECOLOGY

## 2017-05-30 NOTE — MR AVS SNAPSHOT
"Nicole Turner   2017 1:45 PM   Routine Prenatal   MRN: [id number]    Department:  [facility]   Dept Phone:  [phone]    Description:  Female : 1990   Provider:  Bonnie Grewal M.D.           Allergies as of 2017     No Known Allergies      Vital Signs     Blood Pressure Height Weight Body Mass Index Last Menstrual Period Smoking Status    104/62 mmHg 1.499 m (4' 11\") 97.796 kg (215 lb 9.6 oz) 43.52 kg/m2 10/31/2016 Former Smoker      Basic Information     Date Of Birth Sex Race Ethnicity Preferred Language    1990 Female Unknown Non- English      Your appointments     2017  2:45 PM   OB Follow Up with Hafsa Charles M.D.   [facility]    [address]  [place] [address]   [phone]            2017  2:15 PM   OB Follow Up with Hafsa Charles M.D.   [facility]    [address]  [place] [address]   [phone]            2017 10:15 AM   OB Follow Up with Hafsa Charles M.D.   [facility])    [address]  [place] [address]   [phone]            2017  9:30 AM   OB Follow Up with Hafsa Charles M.D.   [facility])    [address]  [place] [address]   [phone]            2017  2:15 PM   OB Follow Up with Hafsa Charles M.D.   [facility])    [address]  [place] [address]   [phone]            2017  2:15 PM   OB Follow Up with Hafsa Charles M.D.   [facility]    [address]  [place] [address]   [phone]            Aug 01, 2017 10:00 AM   OB Follow Up " with Hafsa Charles M.D.   [facility]    [address]  [place] [address] [phone]              Problem List              ICD-10-CM Priority Class Noted - Resolved    Encounter for supervision of normal first pregnancy in first trimester Z34.01   2/3/2017 - Present    Increased BMI - 40.6 R63.8   2/3/2017 - Present    Chlamydia infection- s/p tx- SHELDON next visit A74.9   3/1/2017 - Present      Health Maintenance        Date Due Completion Dates    IMM HEP B VACCINE (1 of 3 - Primary Series) 1990 ---    IMM HEP A VACCINE (1 of 2 - Standard Series) 10/2/1992 ---    IMM HPV VACCINE (1 of 3 - Female 3 Dose Series) 10/2/2002 ---    IMM VARICELLA (CHICKENPOX) VACCINE (1 of 2 - 2 Dose Adolescent Series) 10/2/2004 ---    IMM DTaP/Tdap/Td Vaccine (1 - Tdap) 10/2/2010 ---    PAP SMEAR 2/3/2020 2/3/2017            Current Immunizations     Influenza Vaccine Quad Inj (Preserved) 10/26/2015      Below and/or attached are the medications your provider expects you to take. Review all of your home medications and newly ordered medications with your provider and/or pharmacist. Follow medication instructions as directed by your provider and/or pharmacist. Please keep your medication list with you and share with your provider. Update the information when medications are discontinued, doses are changed, or new medications (including over-the-counter products) are added; and carry medication information at all times in the event of emergency situations     Allergies:  No Known Allergies          Medications  Valid as of: May 30, 2017 -  2:27 PM    Generic Name Brand Name Tablet Size Instructions for use    Azithromycin (Tab) ZITHROMAX 500 MG Take 2 Tabs by mouth every day.        Metoclopramide HCl (Tab) REGLAN 10 MG Take 1 Tab by mouth every 6 hours as needed (nausea).        Prenat w/o A FeCbnFeGlu-FA &B6 (Misc) CITRANATAL B-CALM 20-1 & 25 (2) MG  Take 3 tablet by mouth every day.        .                 Medicines prescribed today were sent to:     [facility] [address] [place] [address] (), [place] [address]    [address] [place] [address] () [place] [address]    Phone: [phone] Fax: [phone]    Open 24 Hours?: No      Medication refill instructions:       If your prescription bottle indicates you have medication refills left, it is not necessary to call your provider’s office. Please contact your pharmacy and they will refill your medication.    If your prescription bottle indicates you do not have any refills left, you may request refills at any time through one of the following ways: The online [employer] system (except Urgent Care), by calling your provider’s office, or by asking your pharmacy to contact your provider’s office with a refill request. Medication refills are processed only during regular business hours and may not be available until the next business day. Your provider may request additional information or to have a follow-up visit with you prior to refilling your medication.   *Please Note: Medication refills are assigned a new Rx number when refilled electronically. Your pharmacy may indicate that no refills were authorized even though a new prescription for the same medication is available at the pharmacy. Please request the medicine by name with the pharmacy before contacting your provider for a refill.           [employer] Access Code: Activation code not generated  Current [employer] Status: Active

## 2017-05-30 NOTE — PROGRESS NOTES
26 y.o.  30w1d The patient is here for routine obstetrical followup. She is without complaints and reports good fetal movement. She denies contractions, vaginal bleeding, or leaking of fluid.    The patient's pregnancy is complicated by   Patient Active Problem List    Diagnosis Date Noted   • Chlamydia infection- s/p tx- SHELDON next visit 2017   • Encounter for supervision of normal first pregnancy in first trimester 2017   • Increased BMI - 40.6 2017     3 hour glucola normal.  Chalmydia sheldon neg    Followup in 2 weeks   labor precautions were discussed with patient  Fetal kick counts were discussed with patient

## 2017-05-30 NOTE — PROGRESS NOTES
Pt here today for ob follow up, good fetal movement, denies LOF,VB.  Pt states no issues or concerns today

## 2017-06-20 ENCOUNTER — ROUTINE PRENATAL (OUTPATIENT)
Dept: OBGYN | Facility: MEDICAL CENTER | Age: 27
End: 2017-06-20
Payer: COMMERCIAL

## 2017-06-20 VITALS
WEIGHT: 216.4 LBS | DIASTOLIC BLOOD PRESSURE: 60 MMHG | BODY MASS INDEX: 43.63 KG/M2 | SYSTOLIC BLOOD PRESSURE: 100 MMHG | HEIGHT: 59 IN

## 2017-06-20 DIAGNOSIS — Z34.03 SUPERVISION OF NORMAL FIRST PREGNANCY IN THIRD TRIMESTER: ICD-10-CM

## 2017-06-20 PROCEDURE — 90040 PR PRENATAL FOLLOW UP: CPT | Performed by: OBSTETRICS & GYNECOLOGY

## 2017-06-20 NOTE — PROGRESS NOTES
"Patient is at 33w1d. Doing well. Good FM, no contractions, no LOF, no VB  Patients' weight gain, fluid intake and exercise level discussed.Vitals, fundal height , fetal position, and FHR reviewed on flowsheet.    .../60 mmHg  Ht 1.499 m (4' 11\")  Wt 98.158 kg (216 lb 6.4 oz)  BMI 43.68 kg/m2  LMP 10/31/2016  Past Medical History   Diagnosis Date   • Migraine    • H/O bladder infections      Patient Active Problem List    Diagnosis Date Noted   • Chlamydia infection- s/p tx- SHELDON next visit 2017   • Encounter for supervision of normal first pregnancy in first trimester 2017   • Increased BMI - 40.6 2017     Lab:No results found for this or any previous visit (from the past 336 hour(s)).    Assessment: 26 year old   1  33w1d  2. Doing well  3. Size equals Dates and/or Scan  4. Weight gain: normal: Yes, excessive:No                  Plan:  1. Rediscuss diet.  2. Increase water intake   3. Continue vitamins.  4. Kick counts as instructed.  5. PTL precautions  6. Encouraged L&D tour  7. OB ff-up visit 2 weeks  "

## 2017-06-20 NOTE — MR AVS SNAPSHOT
"Nicloe Turner   2017 2:45 PM   Routine Prenatal   MRN: [id number]    Department:  [facility]   Dept Phone:  [phone]    Description:  Female : 1990   Provider:  Hafsa Charles M.D.           Allergies as of 2017     No Known Allergies      Vital Signs     Blood Pressure Height Weight Body Mass Index Last Menstrual Period Smoking Status    100/60 mmHg 1.499 m (4' 11\") 98.158 kg (216 lb 6.4 oz) 43.68 kg/m2 10/31/2016 Former Smoker      Basic Information     Date Of Birth Sex Race Ethnicity Preferred Language    1990 Female Unknown Non- English      Your appointments     2017 10:15 AM   OB Follow Up with Hafsa Charles M.D.   [facility])    [address]  [place] [address]   [phone]            2017  2:15 PM   OB Follow Up with Hafsa Charles M.D.   [facility])    [address]  [place] [address]   [phone]            2017  2:15 PM   OB Follow Up with Hafsa Charles M.D.   [facility])    [address]  [place] [address]   [phone]            Aug 01, 2017 10:00 AM   OB Follow Up with Hafas Charles M.D.   [facility]    [address]  [place] [address] [phone]              Problem List              ICD-10-CM Priority Class Noted - Resolved    Encounter for supervision of normal first pregnancy in first trimester Z34.01   2/3/2017 - Present    Increased BMI - 40.6 R63.8   2/3/2017 - Present    Chlamydia infection- s/p tx- SHELDON next visit A74.9   3/1/2017 - Present      Health Maintenance        Date Due Completion Dates    IMM HEP B VACCINE (1 of 3 - Primary Series) 1990 ---    IMM HEP A VACCINE ( - " Standard Series) 10/2/1992 ---    IMM HPV VACCINE (1 of 3 - Female 3 Dose Series) 10/2/2002 ---    IMM VARICELLA (CHICKENPOX) VACCINE (1 of 2 - 2 Dose Adolescent Series) 10/2/2004 ---    IMM DTaP/Tdap/Td Vaccine (1 - Tdap) 10/2/2010 ---    PAP SMEAR 2/3/2020 2/3/2017            Current Immunizations     Influenza Vaccine Quad Inj (Preserved) 10/26/2015      Below and/or attached are the medications your provider expects you to take. Review all of your home medications and newly ordered medications with your provider and/or pharmacist. Follow medication instructions as directed by your provider and/or pharmacist. Please keep your medication list with you and share with your provider. Update the information when medications are discontinued, doses are changed, or new medications (including over-the-counter products) are added; and carry medication information at all times in the event of emergency situations     Allergies:  No Known Allergies          Medications  Valid as of: June 20, 2017 -  3:31 PM    Generic Name Brand Name Tablet Size Instructions for use    Azithromycin (Tab) ZITHROMAX 500 MG Take 2 Tabs by mouth every day.        Metoclopramide HCl (Tab) REGLAN 10 MG Take 1 Tab by mouth every 6 hours as needed (nausea).        Prenat w/o A FeCbnFeGlu-FA &B6 (Misc) CITRANATAL B-CALM 20-1 & 25 (2) MG Take 3 tablet by mouth every day.        .                 Medicines prescribed today were sent to:     [facility] [address] [place] [address]), [place] - [address]    [address] () [place] [address]    Phone: [phone] Fax: [phone]    Open 24 Hours?: No      Medication refill instructions:       If your prescription bottle indicates you have medication refills left, it is not necessary to call your provider’s office. Please contact your pharmacy and they will refill your medication.    If your prescription bottle indicates you do not have any refills left, you may request refills at any time through  one of the following ways: The online [employer] system (except Urgent Care), by calling your provider’s office, or by asking your pharmacy to contact your provider’s office with a refill request. Medication refills are processed only during regular business hours and may not be available until the next business day. Your provider may request additional information or to have a follow-up visit with you prior to refilling your medication.   *Please Note: Medication refills are assigned a new Rx number when refilled electronically. Your pharmacy may indicate that no refills were authorized even though a new prescription for the same medication is available at the pharmacy. Please request the medicine by name with the pharmacy before contacting your provider for a refill.           [employer] Access Code: Activation code not generated  Current [employer] Status: Active

## 2017-07-05 ENCOUNTER — ROUTINE PRENATAL (OUTPATIENT)
Dept: OBGYN | Facility: MEDICAL CENTER | Age: 27
End: 2017-07-05
Payer: COMMERCIAL

## 2017-07-05 ENCOUNTER — HOSPITAL ENCOUNTER (OUTPATIENT)
Facility: MEDICAL CENTER | Age: 27
End: 2017-07-05
Attending: OBSTETRICS & GYNECOLOGY
Payer: COMMERCIAL

## 2017-07-05 VITALS
BODY MASS INDEX: 44.72 KG/M2 | DIASTOLIC BLOOD PRESSURE: 60 MMHG | SYSTOLIC BLOOD PRESSURE: 100 MMHG | HEIGHT: 59 IN | WEIGHT: 221.8 LBS

## 2017-07-05 DIAGNOSIS — Z34.03 SUPERVISION OF NORMAL FIRST PREGNANCY IN THIRD TRIMESTER: ICD-10-CM

## 2017-07-05 PROCEDURE — 87653 STREP B DNA AMP PROBE: CPT

## 2017-07-05 PROCEDURE — 87591 N.GONORRHOEAE DNA AMP PROB: CPT

## 2017-07-05 PROCEDURE — 90040 PR PRENATAL FOLLOW UP: CPT | Performed by: OBSTETRICS & GYNECOLOGY

## 2017-07-05 PROCEDURE — 87491 CHLMYD TRACH DNA AMP PROBE: CPT

## 2017-07-05 NOTE — PROGRESS NOTES
Pt here today for ob follow up, good fetal movement, denies LOF,VB.  Pt states having caryn barber  GBS today

## 2017-07-05 NOTE — MR AVS SNAPSHOT
"Nicole Turner   2017 10:15 AM   Routine Prenatal   MRN: [id number]    Department:  [facility]   Dept Phone:  [phone]    Description:  Female : 1990   Provider:  Hfasa Charles M.D.           Allergies as of 2017     No Known Allergies      You were diagnosed with     Supervision of normal first pregnancy in third trimester   [[id number]]         Vital Signs     Blood Pressure Height Weight Body Mass Index Last Menstrual Period Smoking Status    100/60 mmHg 1.499 m (4' 11\") 100.608 kg (221 lb 12.8 oz) 44.77 kg/m2 10/31/2016 Former Smoker      Basic Information     Date Of Birth Sex Race Ethnicity Preferred Language    1990 Female Unknown Non- English      Your appointments     Jul 10, 2017  8:45 AM   OB Follow Up with Hafsa Charles M.D.   [facility])    [address]  [place] [address] [phone]            2017  9:30 AM   OB Follow Up with Hafsa Charles M.D.   [facility])    [address]  [place] [address] [phone]            2017  8:30 AM   OB Follow Up with Hafsa Charles M.D.   [facility])    [address]  [place] [address] [phone]            Aug 01, 2017 10:00 AM   OB Follow Up with Hafsa Charles M.D.   [facility])    [address]  [place] [address] [phone]              Problem List              ICD-10-CM Priority Class Noted - Resolved    Encounter for supervision of normal first pregnancy in first trimester Z34.01   2/3/2017 - Present    Increased BMI - 40.6 R63.8   2/3/2017 - Present    Chlamydia infection- s/p tx- negative SHELDON A74.9   3/1/2017 - Present      Health Maintenance        Date Due " Completion Dates    IMM HEP B VACCINE (1 of 3 - Primary Series) 1990 ---    IMM HEP A VACCINE (1 of 2 - Standard Series) 10/2/1992 ---    IMM HPV VACCINE (1 of 3 - Female 3 Dose Series) 10/2/2002 ---    IMM VARICELLA (CHICKENPOX) VACCINE (1 of 2 - 2 Dose Adolescent Series) 10/2/2004 ---    IMM DTaP/Tdap/Td Vaccine (1 - Tdap) 10/2/2010 ---    IMM INFLUENZA (1) 9/1/2017 10/26/2015    PAP SMEAR 2/3/2020 2/3/2017            Current Immunizations     Influenza Vaccine Quad Inj (Preserved) 10/26/2015      Below and/or attached are the medications your provider expects you to take. Review all of your home medications and newly ordered medications with your provider and/or pharmacist. Follow medication instructions as directed by your provider and/or pharmacist. Please keep your medication list with you and share with your provider. Update the information when medications are discontinued, doses are changed, or new medications (including over-the-counter products) are added; and carry medication information at all times in the event of emergency situations     Allergies:  No Known Allergies          Medications  Valid as of: July 05, 2017 - 10:55 AM    Generic Name Brand Name Tablet Size Instructions for use    Azithromycin (Tab) ZITHROMAX 500 MG Take 2 Tabs by mouth every day.        Metoclopramide HCl (Tab) REGLAN 10 MG Take 1 Tab by mouth every 6 hours as needed (nausea).        Prenat w/o A FeCbnFeGlu-FA &B6 (Misc) CITRANATAL B-CALM 20-1 & 25 (2) MG Take 3 tablet by mouth every day.        .                 Medicines prescribed today were sent to:     [facility] [address] [place] [address] (), [place] - [address] [facility] [address]    [address] [place] [address] () [place] [address]    Phone: [phone] Fax: [phone]    Open 24 Hours?: No      Medication refill instructions:       If your prescription bottle indicates you have medication refills left, it is not necessary to call your provider’s office. Please contact your pharmacy and  they will refill your medication.    If your prescription bottle indicates you do not have any refills left, you may request refills at any time through one of the following ways: The online [employer] system (except Urgent Care), by calling your provider’s office, or by asking your pharmacy to contact your provider’s office with a refill request. Medication refills are processed only during regular business hours and may not be available until the next business day. Your provider may request additional information or to have a follow-up visit with you prior to refilling your medication.   *Please Note: Medication refills are assigned a new Rx number when refilled electronically. Your pharmacy may indicate that no refills were authorized even though a new prescription for the same medication is available at the pharmacy. Please request the medicine by name with the pharmacy before contacting your provider for a refill.        Your To Do List     Future Labs/Procedures Complete By Expires    CHLAMYDIA/GC PCR URINE OR SWAB  As directed 7/6/2018    GRP B STREP, BY PCR ([facility])  As directed 7/5/2018    INDUCTION OF LABOR  As directed 7/5/2018    Scheduling Instructions:    Pt to get OP gel 8/4/2017 and IOL 8/5/17         [employer] Access Code: Activation code not generated  Current [employer] Status: Active

## 2017-07-05 NOTE — PROGRESS NOTES
"Patient is at 35w2d. Doing well. Good FM, no contractions, no LOF, no VB  Patients' weight gain, fluid intake and exercise level discussed.Vitals, fundal height , fetal position, and FHR reviewed on flowsheet.    .../60 mmHg  Ht 1.499 m (4' 11\")  Wt 100.608 kg (221 lb 12.8 oz)  BMI 44.77 kg/m2  LMP 10/31/2016  Past Medical History   Diagnosis Date   • Migraine    • H/O bladder infections      Patient Active Problem List    Diagnosis Date Noted   • Chlamydia infection- s/p tx- negative SHELDON 2017   • Encounter for supervision of normal first pregnancy in first trimester 2017   • Increased BMI - 40.6 2017     Lab:No results found for this or any previous visit (from the past 336 hour(s)).    Assessment: 26 year old   1  35w2d  2. Doing well  3. Size equals Dates and/or Scan  4. Weight gain: normal: Yes, excessive:No                 Plan:  1. Rediscuss diet.  2. Increase water intake   3. Continue vitamins.  4. Kick counts as instructed.  5. GBS collected  6. PTL precautions  7. OB ff-up visit 1 week  "

## 2017-07-06 LAB
C TRACH DNA SPEC QL NAA+PROBE: NEGATIVE
GP B STREP DNA SPEC QL NAA+PROBE: POSITIVE
N GONORRHOEA DNA SPEC QL NAA+PROBE: NEGATIVE
SPECIMEN SOURCE: NORMAL

## 2017-07-10 ENCOUNTER — ROUTINE PRENATAL (OUTPATIENT)
Dept: OBGYN | Facility: MEDICAL CENTER | Age: 27
End: 2017-07-10
Payer: COMMERCIAL

## 2017-07-10 VITALS
BODY MASS INDEX: 44.76 KG/M2 | WEIGHT: 222 LBS | SYSTOLIC BLOOD PRESSURE: 102 MMHG | HEIGHT: 59 IN | DIASTOLIC BLOOD PRESSURE: 80 MMHG

## 2017-07-10 DIAGNOSIS — Z34.03 SUPERVISION OF NORMAL FIRST PREGNANCY IN THIRD TRIMESTER: ICD-10-CM

## 2017-07-10 DIAGNOSIS — O99.820 GROUP B STREPTOCOCCUS CARRIER, +RV CULTURE, CURRENTLY PREGNANT: ICD-10-CM

## 2017-07-10 PROCEDURE — 90040 PR PRENATAL FOLLOW UP: CPT | Performed by: OBSTETRICS & GYNECOLOGY

## 2017-07-10 NOTE — PROGRESS NOTES
"Patient is at 36w0d. Doing well. Good FM, no contractions, no LOF, no VB  Patients' weight gain, fluid intake and exercise level discussed.Vitals, fundal height , fetal position, and FHR reviewed on flowsheet.    .../80 mmHg  Ht 1.499 m (4' 11.02\")  Wt 100.699 kg (222 lb)  BMI 44.81 kg/m2  LMP 10/31/2016  Past Medical History   Diagnosis Date   • Migraine    • H/O bladder infections      Patient Active Problem List    Diagnosis Date Noted   • Group B Streptococcus carrier, +RV culture, currently pregnant 07/10/2017   • Chlamydia infection- s/p tx- negative SHELDON 2017   • Encounter for supervision of normal first pregnancy in first trimester 2017   • Increased BMI - 40.6 2017     Lab:  Recent Results (from the past 336 hour(s))   CHLAMYDIA/GC PCR URINE OR SWAB    Collection Time: 17 10:43 AM   Result Value Ref Range    Source Genital     C. trachomatis by PCR Negative Negative    N. gonorrhoeae by PCR Negative Negative   GRP B STREP, BY PCR ([facility])    Collection Time: 17 10:43 AM   Result Value Ref Range    Strep Gp B DNA PCR POSITIVE (A) Negative     Assessment: 26 year old   1  36w0d  2. Doing well  3. Size equals Dates and/or Scan  4. Weight gain: normal: Yes, excessive:No                     Plan:  1. Rediscuss diet.  2. Increase water intake   3. Continue vitamins.  4. Kick counts as instructed.  5. PTl/labor precautions  6. OB ff-up visit 1 week  "

## 2017-07-10 NOTE — MR AVS SNAPSHOT
Nicole Turner   7/10/2017 8:45 AM   Routine Prenatal   MRN: [id number]    Department:  [facility]   Dept Phone:  [phone]    Description:  Female : 1990   Provider:  Hafsa Charles M.D.           Allergies as of 7/10/2017     No Known Allergies      You were diagnosed with     Group B Streptococcus carrier, +RV culture, currently pregnant   [[id number]]         Vital Signs     Blood Pressure Weight Last Menstrual Period Smoking Status          102/80 mmHg 100.699 kg (222 lb) 10/31/2016 Former Smoker        Basic Information     Date Of Birth Sex Race Ethnicity Preferred Language    1990 Female Unknown Non- English      Your appointments     2017  9:30 AM   OB Follow Up with Hafsa Charles M.D.   [facility]    [address]  [facility] [address]   [phone]            2017  8:30 AM   OB Follow Up with Hafsa Charles M.D.   [facility]    [address]  [facility] [address]   [phone]            Aug 01, 2017 10:00 AM   OB Follow Up with Hafsa Charles M.D.   [facility]    [address]  [facility] [address]   [phone]            Aug 04, 2017  8:00 PM   [facility] OP GEL IOL with NON-SURGICAL L&D   LABOR & DELIVERY [facility] (--)    [address] [facility] [id number] [phone]            Aug 05, 2017  8:00 AM   [facility] INDUCTION OF LABOR with NON-SURGICAL L&D   LABOR & DELIVERY [facility] (--)    [address] [facility] [id number] [phone]              Problem List              ICD-10-CM Priority Class Noted - Resolved    Encounter for supervision of normal first pregnancy in first trimester Z34.01   2/3/2017 - Present    Increased BMI - 40.6 R63.8   2/3/2017 - Present    Chlamydia infection- s/p tx- negative SHELDON A74.9   3/1/2017  - Present    Group B Streptococcus carrier, +RV culture, currently pregnant O99.820   7/10/2017 - Present      Health Maintenance        Date Due Completion Dates    IMM HEP B VACCINE (1 of 3 - Primary Series) 1990 ---    IMM HEP A VACCINE (1 of 2 - Standard Series) 10/2/1992 ---    IMM HPV VACCINE (1 of 3 - Female 3 Dose Series) 10/2/2002 ---    IMM VARICELLA (CHICKENPOX) VACCINE (1 of 2 - 2 Dose Adolescent Series) 10/2/2004 ---    IMM DTaP/Tdap/Td Vaccine (1 - Tdap) 10/2/2010 ---    IMM INFLUENZA (1) 9/1/2017 10/26/2015    PAP SMEAR 2/3/2020 2/3/2017            Current Immunizations     Influenza Vaccine Quad Inj (Preserved) 10/26/2015      Below and/or attached are the medications your provider expects you to take. Review all of your home medications and newly ordered medications with your provider and/or pharmacist. Follow medication instructions as directed by your provider and/or pharmacist. Please keep your medication list with you and share with your provider. Update the information when medications are discontinued, doses are changed, or new medications (including over-the-counter products) are added; and carry medication information at all times in the event of emergency situations     Allergies:  No Known Allergies          Medications  Valid as of: July 10, 2017 -  9:25 AM    Generic Name Brand Name Tablet Size Instructions for use    Azithromycin (Tab) ZITHROMAX 500 MG Take 2 Tabs by mouth every day.        Metoclopramide HCl (Tab) REGLAN 10 MG Take 1 Tab by mouth every 6 hours as needed (nausea).        Prenat w/o A FeCbnFeGlu-FA &B6 (Misc) CITRANATAL B-CALM 20-1 & 25 (2) MG Take 3 tablet by mouth every day.        .                 Medicines prescribed today were sent to:     [facility] [address] [place] [address] (), [place] [address] [facility] [address]    [address] [place] [address] () [place] [address]    Phone: [phone] Fax: [phone]    Open 24 Hours?: No      Medication refill instructions:       If your  prescription bottle indicates you have medication refills left, it is not necessary to call your provider’s office. Please contact your pharmacy and they will refill your medication.    If your prescription bottle indicates you do not have any refills left, you may request refills at any time through one of the following ways: The online [facility] system (except Urgent Care), by calling your provider’s office, or by asking your pharmacy to contact your provider’s office with a refill request. Medication refills are processed only during regular business hours and may not be available until the next business day. Your provider may request additional information or to have a follow-up visit with you prior to refilling your medication.   *Please Note: Medication refills are assigned a new Rx number when refilled electronically. Your pharmacy may indicate that no refills were authorized even though a new prescription for the same medication is available at the pharmacy. Please request the medicine by name with the pharmacy before contacting your provider for a refill.           [facility] Access Code: Activation code not generated  Current [facility] Status: Active

## 2017-07-10 NOTE — PROGRESS NOTES
Pt here today for OB follow up, good fetal movement, denies LOF, VB. Informed pt about GBS positive. IOL instructions given today.

## 2017-07-18 ENCOUNTER — HOSPITAL ENCOUNTER (OUTPATIENT)
Facility: MEDICAL CENTER | Age: 27
End: 2017-07-18
Attending: OBSTETRICS & GYNECOLOGY | Admitting: OBSTETRICS & GYNECOLOGY
Payer: COMMERCIAL

## 2017-07-18 ENCOUNTER — ROUTINE PRENATAL (OUTPATIENT)
Dept: OBGYN | Facility: MEDICAL CENTER | Age: 27
End: 2017-07-18
Payer: COMMERCIAL

## 2017-07-18 VITALS — DIASTOLIC BLOOD PRESSURE: 70 MMHG | SYSTOLIC BLOOD PRESSURE: 100 MMHG | WEIGHT: 224 LBS | BODY MASS INDEX: 45.22 KG/M2

## 2017-07-18 DIAGNOSIS — O99.820 GROUP B STREPTOCOCCUS CARRIER, +RV CULTURE, CURRENTLY PREGNANT: ICD-10-CM

## 2017-07-18 PROCEDURE — 90040 PR PRENATAL FOLLOW UP: CPT | Performed by: OBSTETRICS & GYNECOLOGY

## 2017-07-18 RX ORDER — BREAST PUMP
1 EACH MISCELLANEOUS DAILY
Qty: 1 EACH | Refills: 0 | Status: SHIPPED | OUTPATIENT
Start: 2017-07-18 | End: 2019-01-04

## 2017-07-18 NOTE — MR AVS SNAPSHOT
Nicole Turner   2017 9:30 AM   Routine Prenatal   MRN: [id number]    Department:  [facility]   Dept Phone:  [phone]    Description:  Female : 1990   Provider:  Hafsa Charles M.D.           Allergies as of 2017     No Known Allergies      You were diagnosed with     Group B Streptococcus carrier, +RV culture, currently pregnant   [[id number]]       Lactating mother   [[id number]]         Vital Signs     Blood Pressure Weight Last Menstrual Period Smoking Status          100/70 mmHg 101.606 kg (224 lb) 10/31/2016 Former Smoker        Basic Information     Date Of Birth Sex Race Ethnicity Preferred Language    1990 Female Unknown Non- English      Your appointments     2017  8:30 AM   OB Follow Up with Hafsa Charles M.D.   [facility])    [address]  [facility] [address]   [phone]            Aug 01, 2017 10:00 AM   OB Follow Up with Hafsa Charles M.D.   [facility])    [address]  [facility] [address]   [phone]            Aug 04, 2017  8:00 PM   [facility] OP GEL IOL with NON-SURGICAL L&D   LAB[facility] DELIVERY [facility] (--)    [address] [facility] [address]   [phone]            Aug 05, 2017  8:00 AM   [facility] INDUCTION OF LABOR with NON-SURGICAL L&D   LABOR [facility] DELIVERY [facility] (--)    [address] [facility] [address]   [phone]              Problem List              ICD-10-CM Priority Class Noted - Resolved    Encounter for supervision of normal first pregnancy in first trimester Z34.01   2/3/2017 - Present    Increased BMI - 40.6 R63.8   2/3/2017 - Present    Chlamydia infection- s/p tx- negative SHELDON A74.9   3/1/2017 - Present    Group B Streptococcus carrier, +RV culture, currently pregnant O99.820   7/10/2017 - Present      Health Maintenance        Date Due Completion Dates    IMM HEP B  VACCINE (1 of 3 - Primary Series) 1990 ---    IMM HEP A VACCINE (1 of 2 - Standard Series) 10/2/1992 ---    IMM HPV VACCINE (1 of 3 - Female 3 Dose Series) 10/2/2002 ---    IMM VARICELLA (CHICKENPOX) VACCINE (1 of 2 - 2 Dose Adolescent Series) 10/2/2004 ---    IMM DTaP/Tdap/Td Vaccine (1 - Tdap) 10/2/2010 ---    IMM INFLUENZA (1) 9/1/2017 10/26/2015    PAP SMEAR 2/3/2020 2/3/2017            Current Immunizations     Influenza Vaccine Quad Inj (Preserved) 10/26/2015      Below and/or attached are the medications your provider expects you to take. Review all of your home medications and newly ordered medications with your provider and/or pharmacist. Follow medication instructions as directed by your provider and/or pharmacist. Please keep your medication list with you and share with your provider. Update the information when medications are discontinued, doses are changed, or new medications (including over-the-counter products) are added; and carry medication information at all times in the event of emergency situations     Allergies:  No Known Allergies          Medications  Valid as of: July 18, 2017 -  9:43 AM    Generic Name Brand Name Tablet Size Instructions for use    Azithromycin (Tab) ZITHROMAX 500 MG Take 2 Tabs by mouth every day.        Metoclopramide HCl (Tab) REGLAN 10 MG Take 1 Tab by mouth every 6 hours as needed (nausea).        Misc. Devices (Misc) Breast Pump  1 Each by Other route every day. Double Electric Pump, Medically Necessary   Z39.1        Prenat w/o A FeCbnFeGlu-FA &B6 (Misc) CITRANATAL B-CALM 20-1 & 25 (2) MG Take 3 tablet by mouth every day.        .                 Medicines prescribed today were sent to:     [facility] [address] [place] [address] (), [place] [address]    [address] [place] [address] () [place] [address]    Phone: [phone] Fax: [phone]    Open 24 Hours?: No      Medication refill instructions:       If your prescription bottle indicates you have medication refills  left, it is not necessary to call your provider’s office. Please contact your pharmacy and they will refill your medication.    If your prescription bottle indicates you do not have any refills left, you may request refills at any time through one of the following ways: The online [employer]MyMeds system (except Urgent Care), by calling your provider’s office, or by asking your pharmacy to contact your provider’s office with a refill request. Medication refills are processed only during regular business hours and may not be available until the next business day. Your provider may request additional information or to have a follow-up visit with you prior to refilling your medication.   *Please Note: Medication refills are assigned a new Rx number when refilled electronically. Your pharmacy may indicate that no refills were authorized even though a new prescription for the same medication is available at the pharmacy. Please request the medicine by name with the pharmacy before contacting your provider for a refill.           [employer]MyMeds Access Code: Activation code not generated  Current CoverMyMeds Status: Active

## 2017-07-25 ENCOUNTER — ROUTINE PRENATAL (OUTPATIENT)
Dept: OBGYN | Facility: MEDICAL CENTER | Age: 27
End: 2017-07-25
Payer: COMMERCIAL

## 2017-07-25 VITALS — SYSTOLIC BLOOD PRESSURE: 120 MMHG | WEIGHT: 227 LBS | DIASTOLIC BLOOD PRESSURE: 84 MMHG | BODY MASS INDEX: 45.82 KG/M2

## 2017-07-25 DIAGNOSIS — O99.820 GROUP B STREPTOCOCCUS CARRIER, +RV CULTURE, CURRENTLY PREGNANT: ICD-10-CM

## 2017-07-25 PROCEDURE — 90471 IMMUNIZATION ADMIN: CPT | Performed by: OBSTETRICS & GYNECOLOGY

## 2017-07-25 PROCEDURE — 90040 PR PRENATAL FOLLOW UP: CPT | Performed by: OBSTETRICS & GYNECOLOGY

## 2017-07-25 PROCEDURE — 90715 TDAP VACCINE 7 YRS/> IM: CPT | Performed by: OBSTETRICS & GYNECOLOGY

## 2017-07-25 NOTE — MR AVS SNAPSHOT
Nicole Dobson ArmenPersaud   2017 8:30 AM   Routine Prenatal   MRN: [id number]    Department:  [facility]   Dept Phone:  [phone]    Description:  Female : 1990   Provider:  Hafsa Charles M.D.           Allergies as of 2017     No Known Allergies      You were diagnosed with     Group B Streptococcus carrier, +RV culture, currently pregnant   [[id number]]         Vital Signs     Blood Pressure Weight Last Menstrual Period Smoking Status          120/84 mmHg 102.967 kg (227 lb) 10/31/2016 Former Smoker        Basic Information     Date Of Birth Sex Race Ethnicity Preferred Language    1990 Female  Non- English      Your appointments     Aug 01, 2017 10:00 AM   OB Follow Up with Hafsa Charles M.D.   [facility]    [address]  [facility] [address]   [phone]            Aug 04, 2017  8:00 PM   [facility] OP GEL IOL with NON-SURGICAL L&D   LAB[facility] DELIVERY [facility] (--)    [address] [facility] [address]   [phone]            Aug 05, 2017  8:00 AM   [facility] INDUCTION OF LABOR with NON-SURGICAL L&D   LABOR & DELIVERY [facility] (--)    [address] [facility] [id number] [phone]              Problem List              ICD-10-CM Priority Class Noted - Resolved    Encounter for supervision of normal first pregnancy in first trimester Z34.01   2/3/2017 - Present    Increased BMI - 40.6 R63.8   2/3/2017 - Present    Chlamydia infection- s/p tx- negative SHELDON A74.9   3/1/2017 - Present    Group B Streptococcus carrier, +RV culture, currently pregnant O99.820   7/10/2017 - Present      Health Maintenance        Date Due Completion Dates    IMM HEP B VACCINE (1 of 3 - Primary Series) 1990 ---    IMM HEP A VACCINE (1 of 2 - Standard Series) 10/2/1992 ---    IMM HPV VACCINE (1 of 3 - Female 3 Dose Series) 10/2/2002 ---    IMM VARICELLA (CHICKENPOX) VACCINE (1 of 2 - 2 Dose Adolescent  Series) 10/2/2004 ---    IMM DTaP/Tdap/Td Vaccine (1 - Tdap) 10/2/2010 ---    IMM INFLUENZA (1) 9/1/2017 10/26/2015    PAP SMEAR 2/3/2020 2/3/2017            Current Immunizations     Influenza Vaccine Quad Inj (Preserved) 10/26/2015    Tdap Vaccine  Incomplete      Below and/or attached are the medications your provider expects you to take. Review all of your home medications and newly ordered medications with your provider and/or pharmacist. Follow medication instructions as directed by your provider and/or pharmacist. Please keep your medication list with you and share with your provider. Update the information when medications are discontinued, doses are changed, or new medications (including over-the-counter products) are added; and carry medication information at all times in the event of emergency situations     Allergies:  No Known Allergies          Medications  Valid as of: July 25, 2017 -  9:14 AM    Generic Name Brand Name Tablet Size Instructions for use    Azithromycin (Tab) ZITHROMAX 500 MG Take 2 Tabs by mouth every day.        Metoclopramide HCl (Tab) REGLAN 10 MG Take 1 Tab by mouth every 6 hours as needed (nausea).        Misc. Devices (Misc) Breast Pump  1 Each by Other route every day. Double Electric Pump, Medically Necessary   Z39.1        Prenat w/o A FeCbnFeGlu-FA &B6 (Misc) CITRANATAL B-CALM 20-1 & 25 (2) MG Take 3 tablet by mouth every day.        .                 Medicines prescribed today were sent to:     [facility] [address] [place] [address]), [place] - [address]    [address] () [place] [address]    Phone: [phone] Fax: [phone]    Open 24 Hours?: No      Medication refill instructions:       If your prescription bottle indicates you have medication refills left, it is not necessary to call your provider’s office. Please contact your pharmacy and they will refill your medication.    If your prescription bottle indicates you do not have any refills left, you may  request refills at any time through one of the following ways: The online [facility] system (except Urgent Care), by calling your provider’s office, or by asking your pharmacy to contact your provider’s office with a refill request. Medication refills are processed only during regular business hours and may not be available until the next business day. Your provider may request additional information or to have a follow-up visit with you prior to refilling your medication.   *Please Note: Medication refills are assigned a new Rx number when refilled electronically. Your pharmacy may indicate that no refills were authorized even though a new prescription for the same medication is available at the pharmacy. Please request the medicine by name with the pharmacy before contacting your provider for a refill.           [facility] Access Code: Activation code not generated  Current [facility] Status: Active

## 2017-07-26 NOTE — PROGRESS NOTES
Patient is at 38w1d. Doing well. Good FM, no contractions, no LOF, no VB  Patients' weight gain, fluid intake and exercise level discussed.Vitals, fundal height , fetal position, and FHR reviewed on flowsheet.    .../84 mmHg  Wt 102.967 kg (227 lb)  LMP 10/31/2016  Past Medical History   Diagnosis Date   • Migraine    • H/O bladder infections      Patient Active Problem List    Diagnosis Date Noted   • Group B Streptococcus carrier, +RV culture, currently pregnant 07/10/2017   • Chlamydia infection- s/p tx- negative SHELDON 2017   • Encounter for supervision of normal first pregnancy in first trimester 2017   • Increased BMI - 40.6 2017     Lab:No results found for this or any previous visit (from the past 336 hour(s)).    Assessment: 26 year old   1  38w1d  2. Doing well  3. Size equals Dates and/or Scan  4. Weight gain: normal: Yes, excessive:No                    Plan:  1. Rediscuss diet.  2. Increase water intake    3. Continue vitamins.  4. Kick counts as instructed.  5. Labor precautions  6. OB ff-up visit 1 week

## 2017-08-01 ENCOUNTER — ROUTINE PRENATAL (OUTPATIENT)
Dept: OBGYN | Facility: MEDICAL CENTER | Age: 27
End: 2017-08-01
Payer: COMMERCIAL

## 2017-08-01 VITALS — DIASTOLIC BLOOD PRESSURE: 80 MMHG | SYSTOLIC BLOOD PRESSURE: 108 MMHG | BODY MASS INDEX: 46.03 KG/M2 | WEIGHT: 228 LBS

## 2017-08-01 DIAGNOSIS — O99.820 GROUP B STREPTOCOCCUS CARRIER, +RV CULTURE, CURRENTLY PREGNANT: ICD-10-CM

## 2017-08-01 DIAGNOSIS — Z34.03 SUPERVISION OF NORMAL FIRST PREGNANCY IN THIRD TRIMESTER: ICD-10-CM

## 2017-08-01 PROCEDURE — 90040 PR PRENATAL FOLLOW UP: CPT | Performed by: OBSTETRICS & GYNECOLOGY

## 2017-08-01 NOTE — MR AVS SNAPSHOT
Nicole Dobson Sravanthi   2017 10:00 AM   Routine Prenatal   MRN: [id number]    Department:  [facility]   Dept Phone:  [phone]    Description:  Female : 1990   Provider:  Hafsa Charles M.D.           Allergies as of 2017     No Known Allergies      You were diagnosed with     Group B Streptococcus carrier, +RV culture, currently pregnant   [[id number]]         Vital Signs     Blood Pressure Weight Last Menstrual Period Smoking Status          108/80 mmHg 103.42 kg (228 lb) 10/31/2016 Former Smoker        Basic Information     Date Of Birth Sex Race Ethnicity Preferred Language    1990 Female  Non- English      Your appointments     Aug 04, 2017  8:00 PM   [facility] OP GEL IOL with NON-SURGICAL L&D   LABOR & DELIVERY [facility] (--)    [address] [facility]  [place] [address]   [phone]            Aug 05, 2017  8:00 AM   [facility] INDUCTION OF LABOR with NON-SURGICAL L&D   LABOR & DELIVERY [facility] (--)    [address] [facility]  [place] [address]   [phone]              Problem List              ICD-10-CM Priority Class Noted - Resolved    Encounter for supervision of normal first pregnancy in first trimester Z34.01   2/3/2017 - Present    Increased BMI - 40.6 R63.8   2/3/2017 - Present    Chlamydia infection- s/p tx- negative SHELDON A74.9   3/1/2017 - Present    Group B Streptococcus carrier, +RV culture, currently pregnant O99.820   7/10/2017 - Present      Health Maintenance        Date Due Completion Dates    IMM HEP B VACCINE (1 of 3 - Primary Series) 1990 ---    IMM HEP A VACCINE (1 of 2 - Standard Series) 10/2/1992 ---    IMM HPV VACCINE (1 of 3 - Female 3 Dose Series) 10/2/2002 ---    IMM VARICELLA (CHICKENPOX) VACCINE (1 of 2 - 2 Dose Adolescent Series) 10/2/2004 ---    IMM INFLUENZA (1) 2017 10/26/2015    PAP SMEAR 2/3/2020 2/3/2017    IMM DTaP/Tdap/Td Vaccine (2 - Td) 2027            Current Immunizations     Influenza Vaccine Quad Inj  (Preserved) 10/26/2015    Tdap Vaccine 7/25/2017      Below and/or attached are the medications your provider expects you to take. Review all of your home medications and newly ordered medications with your provider and/or pharmacist. Follow medication instructions as directed by your provider and/or pharmacist. Please keep your medication list with you and share with your provider. Update the information when medications are discontinued, doses are changed, or new medications (including over-the-counter products) are added; and carry medication information at all times in the event of emergency situations     Allergies:  No Known Allergies          Medications  Valid as of: August 01, 2017 - 10:56 AM    Generic Name Brand Name Tablet Size Instructions for use    Azithromycin (Tab) ZITHROMAX 500 MG Take 2 Tabs by mouth every day.        Metoclopramide HCl (Tab) REGLAN 10 MG Take 1 Tab by mouth every 6 hours as needed (nausea).        Misc. Devices (Misc) Breast Pump  1 Each by Other route every day. Double Electric Pump, Medically Necessary   Z39.1        Prenat w/o A FeCbnFeGlu-FA &B6 (Misc) CITRANATAL B-CALM 20-1 & 25 (2) MG Take 3 tablet by mouth every day.        .                 Medicines prescribed today were sent to:     [facility] [address] [place] [address] (), [place] - [address] () [place] [address]    Phone: [phone] Fax: [phone]    Open 24 Hours?: No      Medication refill instructions:       If your prescription bottle indicates you have medication refills left, it is not necessary to call your provider’s office. Please contact your pharmacy and they will refill your medication.    If your prescription bottle indicates you do not have any refills left, you may request refills at any time through one of the following ways: The online [employer] system (except Urgent Care), by calling your provider’s office, or by asking your pharmacy to contact your provider’s office with a  refill request. Medication refills are processed only during regular business hours and may not be available until the next business day. Your provider may request additional information or to have a follow-up visit with you prior to refilling your medication.   *Please Note: Medication refills are assigned a new Rx number when refilled electronically. Your pharmacy may indicate that no refills were authorized even though a new prescription for the same medication is available at the pharmacy. Please request the medicine by name with the pharmacy before contacting your provider for a refill.           [facility] Access Code: Activation code not generated  Current [facility] Status: Active

## 2017-08-01 NOTE — PROGRESS NOTES
Patient is at 39w1d. Doing well. Good FM, no contractions, no LOF, no VB  Patients' weight gain, fluid intake and exercise level discussed.Vitals, fundal height , fetal position, and FHR reviewed on flowsheet.    .../80 mmHg  Wt 103.42 kg (228 lb)  LMP 10/31/2016  Past Medical History   Diagnosis Date   • Migraine    • H/O bladder infections      Patient Active Problem List    Diagnosis Date Noted   • Group B Streptococcus carrier, +RV culture, currently pregnant 07/10/2017   • Chlamydia infection- s/p tx- negative SHELDON 2017   • Encounter for supervision of normal first pregnancy in first trimester 2017   • Increased BMI - 40.6 2017     Lab:No results found for this or any previous visit (from the past 336 hour(s)).    Assessment: 26 year old   1  39w1d  2. Doing well  3. Size equals Dates and/or Scan  4. Weight gain: normal: Yes, excessive:No                    Plan:  1. Rediscuss diet.  2. Increase water intake    3. Continue vitamins.  4. Kick counts as instructed.  5. Labor precautions  6. IOL reviewed with her. 2017

## 2017-08-04 ENCOUNTER — HOSPITAL ENCOUNTER (OUTPATIENT)
Facility: MEDICAL CENTER | Age: 27
End: 2017-08-04
Attending: OBSTETRICS & GYNECOLOGY | Admitting: OBSTETRICS & GYNECOLOGY
Payer: COMMERCIAL

## 2017-08-04 ENCOUNTER — HOSPITAL ENCOUNTER (INPATIENT)
Facility: MEDICAL CENTER | Age: 27
LOS: 4 days | End: 2017-08-08
Attending: OBSTETRICS & GYNECOLOGY | Admitting: OBSTETRICS & GYNECOLOGY
Payer: COMMERCIAL

## 2017-08-04 PROCEDURE — 700105 HCHG RX REV CODE 258: Performed by: OBSTETRICS & GYNECOLOGY

## 2017-08-04 PROCEDURE — 770002 HCHG ROOM/CARE - OB PRIVATE (112)

## 2017-08-04 RX ORDER — MISOPROSTOL 200 UG/1
800 TABLET ORAL
Status: DISCONTINUED | OUTPATIENT
Start: 2017-08-04 | End: 2017-08-05 | Stop reason: HOSPADM

## 2017-08-04 RX ORDER — ALUMINA, MAGNESIA, AND SIMETHICONE 2400; 2400; 240 MG/30ML; MG/30ML; MG/30ML
30 SUSPENSION ORAL EVERY 6 HOURS PRN
Status: DISCONTINUED | OUTPATIENT
Start: 2017-08-04 | End: 2017-08-05 | Stop reason: HOSPADM

## 2017-08-04 RX ORDER — CARBOPROST TROMETHAMINE 250 UG/ML
250 INJECTION, SOLUTION INTRAMUSCULAR
Status: DISCONTINUED | OUTPATIENT
Start: 2017-08-04 | End: 2017-08-05 | Stop reason: HOSPADM

## 2017-08-04 RX ORDER — PENICILLIN G POTASSIUM 5000000 [IU]/1
5 INJECTION, POWDER, FOR SOLUTION INTRAMUSCULAR; INTRAVENOUS ONCE
Status: COMPLETED | OUTPATIENT
Start: 2017-08-04 | End: 2017-08-05

## 2017-08-04 RX ORDER — DEXTROSE, SODIUM CHLORIDE, SODIUM LACTATE, POTASSIUM CHLORIDE, AND CALCIUM CHLORIDE 5; .6; .31; .03; .02 G/100ML; G/100ML; G/100ML; G/100ML; G/100ML
INJECTION, SOLUTION INTRAVENOUS CONTINUOUS
Status: DISCONTINUED | OUTPATIENT
Start: 2017-08-05 | End: 2017-08-05 | Stop reason: HOSPADM

## 2017-08-04 RX ORDER — METHYLERGONOVINE MALEATE 0.2 MG/ML
0.2 INJECTION INTRAVENOUS
Status: DISCONTINUED | OUTPATIENT
Start: 2017-08-04 | End: 2017-08-05 | Stop reason: HOSPADM

## 2017-08-04 RX ORDER — SODIUM CHLORIDE, SODIUM LACTATE, POTASSIUM CHLORIDE, CALCIUM CHLORIDE 600; 310; 30; 20 MG/100ML; MG/100ML; MG/100ML; MG/100ML
INJECTION, SOLUTION INTRAVENOUS CONTINUOUS
Status: DISPENSED | OUTPATIENT
Start: 2017-08-04 | End: 2017-08-05

## 2017-08-04 RX ADMIN — SODIUM CHLORIDE, POTASSIUM CHLORIDE, SODIUM LACTATE AND CALCIUM CHLORIDE: 600; 310; 30; 20 INJECTION, SOLUTION INTRAVENOUS at 22:30

## 2017-08-04 ASSESSMENT — LIFESTYLE VARIABLES
EVER_SMOKED: YES
DO YOU DRINK ALCOHOL: NO
ALCOHOL_USE: NO

## 2017-08-04 NOTE — IP AVS SNAPSHOT
8/8/2017    [facility]  [address]  [address]  [facility] [address]    Dear Nicole:    [facility] wants to ensure your discharge home is safe and you or your loved ones have had all of your questions answered regarding your care after you leave the hospital.    Below is a list of resources and contact information should you have any questions regarding your hospital stay, follow-up instructions, or active medical symptoms.    Questions or Concerns Regarding… Contact   Medical Questions Related to Your Discharge  (7 days a week, 8am-5pm) Contact a Nurse Care Coordinator   [phone]   Medical Questions Not Related to Your Discharge  (24 hours a day / 7 days a week)  Contact the Nurse Health Line   [phone]    Medications or Discharge Instructions Refer to your discharge packet   or contact your [facility] Primary Care Provider   [phone]   Follow-up Appointment(s) Schedule your appointment via Sverve   or contact Scheduling [phone]   Billing Review your statement via Sverve  or contact Billing [phone]   Medical Records Review your records via Sverve   or contact Medical Records [phone]     You may receive a telephone call within two days of discharge. This call is to make certain you understand your discharge instructions and have the opportunity to have any questions answered. You can also easily access your medical information, test results and upcoming appointments via the Sverve free online health management tool. You can learn more and sign up at HuddleApp/Sverve. For assistance setting up your Sverve account, please call [phone].    Once again, we want to ensure your discharge home is safe and that you have a clear understanding of any next steps in your care. If you have any questions or concerns, please do not hesitate to contact us, we are here for you. Thank you for choosing [facility] for your healthcare needs.    Sincerely,    Your [facility] Healthcare Team

## 2017-08-04 NOTE — IP AVS SNAPSHOT
Online Warmongers Access Code: Activation code not generated  Current Online Warmongers Status: Active    [email or web address]  A secure, online tool to manage your health information     [employer]’s Online Warmongers® is a secure, online tool that connects you to your personalized health information from the privacy of your home -- day or night - making it very easy for you to manage your healthcare. Once the activation process is completed, you can even access your medical information using the Online Warmongers rito, which is available for free in the Apple Rito store or Google Play store.     Online Warmongers provides the following levels of access (as shown below):   My Chart Features   [facility] Primary Care Doctor [facility]  Specialists [facility]  Urgent  Care Non-[facility]  Primary Care  Doctor   Email your healthcare team securely and privately 24/7 X X X X   Manage appointments: schedule your next appointment; view details of past/upcoming appointments X      Request prescription refills. X      View recent personal medical records, including lab and immunizations X X X X   View health record, including health history, allergies, medications X X X X   Read reports about your outpatient visits, procedures, consult and ER notes X X X X   See your discharge summary, which is a recap of your hospital and/or ER visit that includes your diagnosis, lab results, and care plan. X X       How to register for Online Warmongers:  1. Go to  [email or web address].  2. Click on the Sign Up Now box, which takes you to the New Member Sign Up page. You will need to provide the following information:  a. Enter your Online Warmongers Access Code exactly as it appears at the top of this page. (You will not need to use this code after you’ve completed the sign-up process. If you do not sign up before the expiration date, you must request a new code.)   b. Enter your date of birth.   c. Enter your home email address.   d. Click Submit, and follow the next screen’s instructions.  3. Create a Online Warmongers ID. This will  be your Naow login ID and cannot be changed, so think of one that is secure and easy to remember.  4. Create a Naow password. You can change your password at any time.  5. Enter your Password Reset Question and Answer. This can be used at a later time if you forget your password.   6. Enter your e-mail address. This allows you to receive e-mail notifications when new information is available in Naow.  7. Click Sign Up. You can now view your health information.    For assistance activating your Naow account, call [phone]

## 2017-08-04 NOTE — IP AVS SNAPSHOT
" Home Care Instructions                                                                                                                Nicole Fishman   MRN: [id number]    Department:  POST PARTUM [facility]   2017           Follow-up Information     1. Schedule an appointment as soon as possible for a visit with Hafsa Charles M.D..    Specialty:  OB/Gyn    Why:  Please use \"plastic\" wrap over incision to shower. Pat dry with towel. Keep dry. DO NOT remove steri-strips.     Contact information    [id number]  [place] [address] [phone]         I assume responsibility for securing a follow-up  Screening blood test on my baby within the specified date range.    -                  Discharge Instructions       POSTPARTUM DISCHARGE INSTRUCTIONS FOR MOM    YOB: 1990   Age: 26 y.o.               Admit Date: 2017     Discharge Date: 2017  Attending Doctor:  Hafsa Monsivais*                  Allergies:  Review of patient's allergies indicates no known allergies.    Discharged to home by car. Discharged via wheelchair, hospital escort: Yes.  Special equipment needed: Not Applicable  Belongings with: Personal  Be sure to schedule a follow-up appointment with your primary care doctor or any specialists as instructed.     Discharge Plan:   Diet Plan: Discussed  Activity Level: Discussed  Confirmed Follow up Appointment: Patient to Call and Schedule Appointment  Confirmed Symptoms Management: Discussed  Medication Reconciliation Updated: Yes  Influenza Vaccine Indication: Indicated: Not available from distributor/    REASONS TO CALL YOUR OBSTETRICIAN:  1.   Persistent fever or shaking chills (Temperature higher than 100.4)  2.   Heavy bleeding (soaking more than 1 pad per hour); Passing clots  3.   Foul odor from vagina  4.   Mastitis (Breast infection; breast pain, chills, fever, redness)  5.   Urinary pain, burning or frequency  6.   " "Abdominal incision infection  7.   Severe depression longer than 24 hours    HAND WASHING  · Prior to handling the baby.  · Before breastfeeding or bottle feeding baby.  · After using the bathroom or changing the baby's diaper.    WOUND CARE  Ask your physician for additional care instructions.  In general:    ·  Incision:      · Keep clean and dry.    · Do NOT lift anything heavier than your baby for up to 6 weeks.    · There should not be any opening or pus.      VAGINAL CARE  · Nothing inside vagina for 6 weeks: no sexual intercourse, tampons or douching.  · Bleeding may continue for 2-4 weeks.  Amount may vary.    · Call your physician for heavy bleeding which means soaking more than 1 pad per hour    BIRTH CONTROL  · It is possible to become pregnant at any time after delivery and while breastfeeding.  · Plan to discuss a method of birth control with your physician at your follow up visit. visit.    DIET AND ELIMINATION  · Eating more fiber (bran cereal, fruits, and vegetables) and drinking plenty of fluids will help to avoid constipation.  · Urinary frequency after childbirth is normal.    POSTPARTUM BLUES  During the first few days after birth, you may experience a sense of the \"blues\" which may include impatience, irritability or even crying.  These feeling come and go quickly.  However, as many as 1 in 10 women experience emotional symptoms known as postpartum depression.    Postpartum depression:  May start as early as the second or third day after delivery or take several weeks or months to develop.  Symptoms of \"blues\" are present, but are more intense:  Crying spells; loss of appetite; feelings of hopelessness or loss of control; fear of touching the baby; over concern or no concern at all about the baby; little or no concern about your own appearance/caring for yourself; and/or inability to sleep or excessive sleeping.  Contact your physician if you are experiencing any of these " "symptoms.    Crisis Hotline:  · [place] Crisis Hotline:  3-050-ZZBPGVS  Or 1-583.830.1098  · [place] Crisis Hotline:  1-767.905.6733  Or 645-251-9572    PREVENTING SHAKEN BABY:  If you are angry or stressed, PUT THE BABY IN THE CRIB, step away, take some deep breaths, and wait until you are calm to care for the baby.  DO NOT SHAKE THE BABY.  You are not alone, call a supporter for help.    · Crisis Call Center 24/7 crisis line 165-100-6656 or 1-930.800.6810  · You can also text them, text \"ANSWER\" to 529436    QUIT SMOKING/TOBACCO USE:  I understand the use of any tobacco products increases my chance of suffering from future heart disease and could cause other illnesses which may shorten my life. Quitting the use of tobacco products is the single most important thing I can do to improve my health. For further information on smoking / tobacco cessation call a Toll Free Quit Line at 1-322.494.3361 (*National [employer] [place]) or 1-913.720.3177 (American Lung Association) or you can access the web based program at www.lungusa.org.    · [place] Tobacco Users Help Line:  (397) 690-8302       Toll Free: 1-272.256.8386  · Quit Tobacco Program [facility] (670)914-3470    DEPRESSION / SUICIDE RISK:  As you are discharged from this [facility], it is important to learn how to keep safe from harming yourself.    Recognize the warning signs:  · Abrupt changes in personality, positive or negative- including increase in energy   · Giving away possessions  · Change in eating patterns- significant weight changes-  positive or negative  · Change in sleeping patterns- unable to sleep or sleeping all the time   · Unwillingness or inability to communicate  · Depression  · Unusual sadness, discouragement and loneliness  · Talk of wanting to die  · Neglect of personal appearance   · Rebelliousness- reckless behavior  · Withdrawal from people/activities they love  · Confusion- inability to " concentrate     If you or a loved one observes any of these behaviors or has concerns about self-harm, here's what you can do:  · Talk about it- your feelings and reasons for harming yourself  · Remove any means that you might use to hurt yourself (examples: pills, rope, extension cords, firearm)  · Get professional help from the community (Mental Health, Substance Abuse, psychological counseling)  · Do not be alone:Call your Safe Contact- someone whom you trust who will be there for you.  · Call your local CRISIS HOTLINE 168-5012 or 556-860-9450  · Call your local Children's Mobile Crisis Response Team [place] (676) 946-8969 or www.MynewMD  · Call the toll free National Suicide Prevention Hotlines   · National Suicide Prevention Lifeline 099-873-EIQF (5318)  · National Hope Line Network 800-SUICIDE (105-2131)    DISCHARGE SURVEY:  Thank you for choosing [facility].  We hope we provided you with very good care.  You may be receiving a survey in the mail.  Please fill it out.  Your opinion is valuable to us.    ADDITIONAL EDUCATIONAL MATERIALS GIVEN TO PATIENT:        My signature on this form indicates that:  1.  I have reviewed and understand the above information  2.  My questions regarding this information have been answered to my satisfaction.  3.  I have formulated a plan with my discharge nurse to obtain my prescribed medication for home.         Discharge Medication Instructions:    Below are the medications your physician expects you to take upon discharge:    Review all your home medications and newly ordered medications with your doctor and/or pharmacist. Follow medication instructions as directed by your doctor and/or pharmacist.    Please keep your medication list with you and share with your physician.               Medication List      START taking these medications        Instructions    Morning Afternoon Evening Bedtime    ibuprofen 600 MG Tabs   Last time this was given:  600 mg on  8/8/2017  5:20 AM   Commonly known as:  MOTRIN        Take 1 Tab by mouth every 6 hours as needed for Moderate Pain (cramping).   Dose:  600 mg                        oxycodone-acetaminophen 5-325 MG Tabs   Last time this was given:  1 Tab on 8/8/2017  5:20 AM   Commonly known as:  PERCOCET        Take 1 Tab by mouth every four hours as needed for Moderate Pain or Severe Pain.   Dose:  1 Tab                          CONTINUE taking these medications        Instructions    Morning Afternoon Evening Bedtime    Breast Pump Misc        1 Each by Other route every day. Double Electric Pump, Medically Necessary  Z39.1   Dose:  1 Each                        CITRANATAL B-CALM 20-1 & 25 (2) MG Misc        Take 3 tablet by mouth every day.   Dose:  3 tablet                             Where to Get Your Medications      Information about where to get these medications is not yet available     ! Ask your nurse or doctor about these medications    - CITRANATAL B-CALM 20-1 & 25 (2) MG Misc  - ibuprofen 600 MG Tabs  - oxycodone-acetaminophen 5-325 MG Tabs            Crisis Hotline:     [place] Crisis Hotline:  7-793-ITJTBQN or 1-632.283.1070    [place] Crisis Hotline:    1-573.160.8819 or 431-607-4216        Disclaimer           _____________________________________                     __________       ________       Patient/Mother Signature or Legal                          Date                   Time

## 2017-08-05 LAB
BASOPHILS # BLD AUTO: 0.2 % (ref 0–1.8)
BASOPHILS # BLD: 0.04 K/UL (ref 0–0.12)
EOSINOPHIL # BLD AUTO: 0.04 K/UL (ref 0–0.51)
EOSINOPHIL NFR BLD: 0.2 % (ref 0–6.9)
ERYTHROCYTE [DISTWIDTH] IN BLOOD BY AUTOMATED COUNT: 44.9 FL (ref 35.9–50)
ERYTHROCYTE [DISTWIDTH] IN BLOOD BY AUTOMATED COUNT: 46.9 FL (ref 35.9–50)
HCT VFR BLD AUTO: 33.5 % (ref 37–47)
HCT VFR BLD AUTO: 37.3 % (ref 37–47)
HGB BLD-MCNC: 11.1 G/DL (ref 12–16)
HGB BLD-MCNC: 12.1 G/DL (ref 12–16)
HOLDING TUBE BB 8507: NORMAL
IMM GRANULOCYTES # BLD AUTO: 0.1 K/UL (ref 0–0.11)
IMM GRANULOCYTES NFR BLD AUTO: 0.6 % (ref 0–0.9)
LYMPHOCYTES # BLD AUTO: 2.69 K/UL (ref 1–4.8)
LYMPHOCYTES NFR BLD: 16.1 % (ref 22–41)
MCH RBC QN AUTO: 29.8 PG (ref 27–33)
MCH RBC QN AUTO: 30.3 PG (ref 27–33)
MCHC RBC AUTO-ENTMCNC: 32.4 G/DL (ref 33.6–35)
MCHC RBC AUTO-ENTMCNC: 33.1 G/DL (ref 33.6–35)
MCV RBC AUTO: 89.8 FL (ref 81.4–97.8)
MCV RBC AUTO: 93.5 FL (ref 81.4–97.8)
MONOCYTES # BLD AUTO: 0.69 K/UL (ref 0–0.85)
MONOCYTES NFR BLD AUTO: 4.1 % (ref 0–13.4)
NEUTROPHILS # BLD AUTO: 13.18 K/UL (ref 2–7.15)
NEUTROPHILS NFR BLD: 78.8 % (ref 44–72)
NRBC # BLD AUTO: 0 K/UL
NRBC BLD AUTO-RTO: 0 /100 WBC
PLATELET # BLD AUTO: 291 K/UL (ref 164–446)
PLATELET # BLD AUTO: 301 K/UL (ref 164–446)
PMV BLD AUTO: 9.4 FL (ref 9–12.9)
PMV BLD AUTO: 9.7 FL (ref 9–12.9)
RBC # BLD AUTO: 3.73 M/UL (ref 4.2–5.4)
RBC # BLD AUTO: 3.99 M/UL (ref 4.2–5.4)
WBC # BLD AUTO: 16.7 K/UL (ref 4.8–10.8)
WBC # BLD AUTO: 18.1 K/UL (ref 4.8–10.8)

## 2017-08-05 PROCEDURE — 700105 HCHG RX REV CODE 258: Performed by: OBSTETRICS & GYNECOLOGY

## 2017-08-05 PROCEDURE — 303615 HCHG EPIDURAL/SPINAL ANESTHESIA FOR LABOR

## 2017-08-05 PROCEDURE — 36415 COLL VENOUS BLD VENIPUNCTURE: CPT

## 2017-08-05 PROCEDURE — 700102 HCHG RX REV CODE 250 W/ 637 OVERRIDE(OP): Performed by: ANESTHESIOLOGY

## 2017-08-05 PROCEDURE — 700111 HCHG RX REV CODE 636 W/ 250 OVERRIDE (IP): Performed by: OBSTETRICS & GYNECOLOGY

## 2017-08-05 PROCEDURE — 85025 COMPLETE CBC W/AUTO DIFF WBC: CPT

## 2017-08-05 PROCEDURE — 3E033VJ INTRODUCTION OF OTHER HORMONE INTO PERIPHERAL VEIN, PERCUTANEOUS APPROACH: ICD-10-PCS | Performed by: OBSTETRICS & GYNECOLOGY

## 2017-08-05 PROCEDURE — 85027 COMPLETE CBC AUTOMATED: CPT

## 2017-08-05 PROCEDURE — 306828 HCHG ANES-TIME GENERAL: Performed by: OBSTETRICS & GYNECOLOGY

## 2017-08-05 PROCEDURE — 304964 HCHG RECOVERY ROOM TIME 1HR

## 2017-08-05 PROCEDURE — A9270 NON-COVERED ITEM OR SERVICE: HCPCS | Performed by: OBSTETRICS & GYNECOLOGY

## 2017-08-05 PROCEDURE — 700111 HCHG RX REV CODE 636 W/ 250 OVERRIDE (IP): Performed by: ANESTHESIOLOGY

## 2017-08-05 PROCEDURE — 59514 CESAREAN DELIVERY ONLY: CPT

## 2017-08-05 PROCEDURE — 700111 HCHG RX REV CODE 636 W/ 250 OVERRIDE (IP)

## 2017-08-05 PROCEDURE — 770002 HCHG ROOM/CARE - OB PRIVATE (112)

## 2017-08-05 PROCEDURE — 700112 HCHG RX REV CODE 229: Performed by: OBSTETRICS & GYNECOLOGY

## 2017-08-05 PROCEDURE — 305385 HCHG SURGICAL SERVICES 1/4 HOUR

## 2017-08-05 PROCEDURE — A9270 NON-COVERED ITEM OR SERVICE: HCPCS | Performed by: ANESTHESIOLOGY

## 2017-08-05 RX ORDER — SODIUM CHLORIDE, SODIUM LACTATE, POTASSIUM CHLORIDE, CALCIUM CHLORIDE 600; 310; 30; 20 MG/100ML; MG/100ML; MG/100ML; MG/100ML
INJECTION, SOLUTION INTRAVENOUS PRN
Status: DISCONTINUED | OUTPATIENT
Start: 2017-08-05 | End: 2017-08-08 | Stop reason: HOSPADM

## 2017-08-05 RX ORDER — METOCLOPRAMIDE HYDROCHLORIDE 5 MG/ML
10 INJECTION INTRAMUSCULAR; INTRAVENOUS ONCE
Status: COMPLETED | OUTPATIENT
Start: 2017-08-05 | End: 2017-08-05

## 2017-08-05 RX ORDER — CEFAZOLIN SODIUM 1 G/3ML
1 INJECTION, POWDER, FOR SOLUTION INTRAMUSCULAR; INTRAVENOUS ONCE
Status: COMPLETED | OUTPATIENT
Start: 2017-08-05 | End: 2017-08-05

## 2017-08-05 RX ORDER — ONDANSETRON 2 MG/ML
4 INJECTION INTRAMUSCULAR; INTRAVENOUS EVERY 6 HOURS PRN
Status: DISCONTINUED | OUTPATIENT
Start: 2017-08-05 | End: 2017-08-06

## 2017-08-05 RX ORDER — NALOXONE HYDROCHLORIDE 0.4 MG/ML
0.1 INJECTION, SOLUTION INTRAMUSCULAR; INTRAVENOUS; SUBCUTANEOUS PRN
Status: DISCONTINUED | OUTPATIENT
Start: 2017-08-05 | End: 2017-08-06

## 2017-08-05 RX ORDER — SIMETHICONE 80 MG
80 TABLET,CHEWABLE ORAL 4 TIMES DAILY PRN
Status: DISCONTINUED | OUTPATIENT
Start: 2017-08-05 | End: 2017-08-08 | Stop reason: HOSPADM

## 2017-08-05 RX ORDER — METHYLERGONOVINE MALEATE 0.2 MG/ML
0.2 INJECTION INTRAVENOUS
Status: DISCONTINUED | OUTPATIENT
Start: 2017-08-05 | End: 2017-08-08 | Stop reason: HOSPADM

## 2017-08-05 RX ORDER — OXYCODONE AND ACETAMINOPHEN 10; 325 MG/1; MG/1
1 TABLET ORAL EVERY 4 HOURS PRN
Status: DISCONTINUED | OUTPATIENT
Start: 2017-08-05 | End: 2017-08-06

## 2017-08-05 RX ORDER — VITAMIN A ACETATE, BETA CAROTENE, ASCORBIC ACID, CHOLECALCIFEROL, .ALPHA.-TOCOPHEROL ACETATE, DL-, THIAMINE MONONITRATE, RIBOFLAVIN, NIACINAMIDE, PYRIDOXINE HYDROCHLORIDE, FOLIC ACID, CYANOCOBALAMIN, CALCIUM CARBONATE, FERROUS FUMARATE, ZINC OXIDE, CUPRIC OXIDE 3080; 12; 120; 400; 1; 1.84; 3; 20; 22; 920; 25; 200; 27; 10; 2 [IU]/1; UG/1; MG/1; [IU]/1; MG/1; MG/1; MG/1; MG/1; MG/1; [IU]/1; MG/1; MG/1; MG/1; MG/1; MG/1
1 TABLET, FILM COATED ORAL EVERY MORNING
Status: DISCONTINUED | OUTPATIENT
Start: 2017-08-06 | End: 2017-08-08 | Stop reason: HOSPADM

## 2017-08-05 RX ORDER — SODIUM CHLORIDE, SODIUM LACTATE, POTASSIUM CHLORIDE, CALCIUM CHLORIDE 600; 310; 30; 20 MG/100ML; MG/100ML; MG/100ML; MG/100ML
INJECTION, SOLUTION INTRAVENOUS CONTINUOUS
Status: DISCONTINUED | OUTPATIENT
Start: 2017-08-05 | End: 2017-08-05 | Stop reason: HOSPADM

## 2017-08-05 RX ORDER — METHYLERGONOVINE MALEATE 0.2 MG/ML
0.2 INJECTION INTRAVENOUS
Status: DISCONTINUED | OUTPATIENT
Start: 2017-08-05 | End: 2017-08-05 | Stop reason: HOSPADM

## 2017-08-05 RX ORDER — BISACODYL 10 MG
10 SUPPOSITORY, RECTAL RECTAL PRN
Status: DISCONTINUED | OUTPATIENT
Start: 2017-08-05 | End: 2017-08-08 | Stop reason: HOSPADM

## 2017-08-05 RX ORDER — DIPHENHYDRAMINE HYDROCHLORIDE 50 MG/ML
12.5 INJECTION INTRAMUSCULAR; INTRAVENOUS EVERY 6 HOURS PRN
Status: DISCONTINUED | OUTPATIENT
Start: 2017-08-05 | End: 2017-08-06

## 2017-08-05 RX ORDER — DOCUSATE SODIUM 100 MG/1
100 CAPSULE, LIQUID FILLED ORAL 2 TIMES DAILY PRN
Status: DISCONTINUED | OUTPATIENT
Start: 2017-08-05 | End: 2017-08-08 | Stop reason: HOSPADM

## 2017-08-05 RX ORDER — CARBOPROST TROMETHAMINE 250 UG/ML
250 INJECTION, SOLUTION INTRAMUSCULAR
Status: DISCONTINUED | OUTPATIENT
Start: 2017-08-05 | End: 2017-08-05 | Stop reason: HOSPADM

## 2017-08-05 RX ORDER — KETOROLAC TROMETHAMINE 30 MG/ML
30 INJECTION, SOLUTION INTRAMUSCULAR; INTRAVENOUS EVERY 6 HOURS
Status: COMPLETED | OUTPATIENT
Start: 2017-08-05 | End: 2017-08-06

## 2017-08-05 RX ORDER — CARBOPROST TROMETHAMINE 250 UG/ML
250 INJECTION, SOLUTION INTRAMUSCULAR
Status: DISCONTINUED | OUTPATIENT
Start: 2017-08-05 | End: 2017-08-08 | Stop reason: HOSPADM

## 2017-08-05 RX ORDER — MISOPROSTOL 200 UG/1
600 TABLET ORAL
Status: DISCONTINUED | OUTPATIENT
Start: 2017-08-05 | End: 2017-08-08 | Stop reason: HOSPADM

## 2017-08-05 RX ORDER — DIPHENHYDRAMINE HYDROCHLORIDE 50 MG/ML
25 INJECTION INTRAMUSCULAR; INTRAVENOUS EVERY 6 HOURS PRN
Status: DISCONTINUED | OUTPATIENT
Start: 2017-08-05 | End: 2017-08-06

## 2017-08-05 RX ORDER — MISOPROSTOL 200 UG/1
800 TABLET ORAL
Status: DISCONTINUED | OUTPATIENT
Start: 2017-08-05 | End: 2017-08-05 | Stop reason: HOSPADM

## 2017-08-05 RX ORDER — OXYCODONE HYDROCHLORIDE AND ACETAMINOPHEN 5; 325 MG/1; MG/1
1 TABLET ORAL EVERY 4 HOURS PRN
Status: DISCONTINUED | OUTPATIENT
Start: 2017-08-05 | End: 2017-08-06

## 2017-08-05 RX ADMIN — CEFAZOLIN 1 G: 1 INJECTION, POWDER, FOR SOLUTION INTRAVENOUS at 04:30

## 2017-08-05 RX ADMIN — KETOROLAC TROMETHAMINE 30 MG: 30 INJECTION, SOLUTION INTRAMUSCULAR at 23:03

## 2017-08-05 RX ADMIN — SODIUM CHLORIDE, POTASSIUM CHLORIDE, SODIUM LACTATE AND CALCIUM CHLORIDE: 600; 310; 30; 20 INJECTION, SOLUTION INTRAVENOUS at 00:04

## 2017-08-05 RX ADMIN — FAMOTIDINE 20 MG: 10 INJECTION, SOLUTION INTRAVENOUS at 04:11

## 2017-08-05 RX ADMIN — DOCUSATE SODIUM 100 MG: 100 CAPSULE ORAL at 23:03

## 2017-08-05 RX ADMIN — KETOROLAC TROMETHAMINE 30 MG: 30 INJECTION, SOLUTION INTRAMUSCULAR at 17:06

## 2017-08-05 RX ADMIN — ONDANSETRON 4 MG: 2 INJECTION INTRAMUSCULAR; INTRAVENOUS at 11:25

## 2017-08-05 RX ADMIN — METOCLOPRAMIDE 10 MG: 5 INJECTION, SOLUTION INTRAMUSCULAR; INTRAVENOUS at 04:11

## 2017-08-05 RX ADMIN — OXYTOCIN 1 MILLI-UNITS/MIN: 10 INJECTION, SOLUTION INTRAMUSCULAR; INTRAVENOUS at 02:30

## 2017-08-05 RX ADMIN — SODIUM CHLORIDE, POTASSIUM CHLORIDE, SODIUM LACTATE AND CALCIUM CHLORIDE: 600; 310; 30; 20 INJECTION, SOLUTION INTRAVENOUS at 02:54

## 2017-08-05 RX ADMIN — KETOROLAC TROMETHAMINE 30 MG: 30 INJECTION, SOLUTION INTRAMUSCULAR at 11:14

## 2017-08-05 RX ADMIN — PENICILLIN G POTASSIUM 5 MILLION UNITS: 5000000 POWDER, FOR SOLUTION INTRAMUSCULAR; INTRAPLEURAL; INTRATHECAL; INTRAVENOUS at 00:15

## 2017-08-05 RX ADMIN — SODIUM CITRATE AND CITRIC ACID MONOHYDRATE 30 ML: 500; 334 SOLUTION ORAL at 04:11

## 2017-08-05 ASSESSMENT — COPD QUESTIONNAIRES
DURING THE PAST 4 WEEKS HOW MUCH DID YOU FEEL SHORT OF BREATH: NONE/LITTLE OF THE TIME
COPD SCREENING SCORE: 0
DO YOU EVER COUGH UP ANY MUCUS OR PHLEGM?: NO/ONLY WITH OCCASIONAL COLDS OR INFECTIONS
HAVE YOU SMOKED AT LEAST 100 CIGARETTES IN YOUR ENTIRE LIFE: NO/DON'T KNOW

## 2017-08-05 ASSESSMENT — PAIN SCALES - GENERAL
PAINLEVEL_OUTOF10: 3
PAINLEVEL_OUTOF10: 2
PAINLEVEL_OUTOF10: 1
PAINLEVEL_OUTOF10: 5
PAINLEVEL_OUTOF10: 0
PAINLEVEL_OUTOF10: 4

## 2017-08-05 NOTE — CARE PLAN
Problem: Alteration in comfort related to surgical incision and/or after birth pains  Goal: Patient is able to ambulate, care for self and infant with acceptable pain level  Outcome: PROGRESSING AS EXPECTED  Will medicate for pain every six hours.    Problem: Potential knowledge deficit related to lack of understanding of self and  care  Goal: Patient will verbalize understanding of self and infant care  Outcome: PROGRESSING AS EXPECTED  Discussed plan of care

## 2017-08-05 NOTE — OP REPORT
DATE OF SERVICE:  2017    PREOPERATIVE DIAGNOSES:  Intrauterine pregnancy at term with non-reassuring   fetal heart rate status, remote from delivery.    POSTOPERATIVE DIAGNOSES:  Intrauterine pregnancy at term with non-reassuring   fetal heart rate status, remote from delivery.    PROCEDURE PERFORMED:  Primary low transverse .    SURGEON:  Marisela Romero MD    ASSISTANT:  Pam Wall, certified nurse midwife.    ANESTHESIA:  Provided by alvaro Camarena.    ESTIMATED BLOOD LOSS:  500 mL    INTRAOPERATIVE FINDINGS:  A male infant, vertex presentation, Apgars of 8 and   9, normal uterus, tubes, and ovaries.    PROCEDURE IN DETAIL:  This patient was admitted for induction of labor and had   some suspicious areas noted on her fetal heart rate tracing consistent with   potential late deceleration.  She was kept in prolonged monitor, Pitocin was   eventually started at which point the patient had a prolonged bradycardic   episode in the 50s for approximately 6-7 minutes.  Baby returned to baseline   and was intrauterine resuscitated, but decision was made to proceed with   primary  delivery as she was only 2 cm dilated.  At that point, the   patient was taken to the operating room.  She had been appropriately   consented.  She was prepped and draped in usual sterile fashion.  A   Pfannenstiel skin incision made, carried down to the underlying layer of   fascia with the knife.  The fascia was nicked in the midline and the fascial   incision extended laterally on both sides with Walton scissors.  The rectus   muscles were then dissected off the posterior aspect of the fascia both   superiorly and inferiorly.  They were  in the midline.  The   peritoneum was elevated between 2 hemostats, entered sharply with Metzenbaum   scissors and the peritoneal incision then extended superiorly and inferiorly   with good visualization of bowel and bladder.  The vesicouterine peritoneum   identified  and a bladder flap created.  Uterus incised in a low transverse   fashion and extended bluntly.  The male infant was then delivered through the   incision without complication.  The cord was then clamped x2 and cut.  The   infant male was handed to awaiting nursing staff.  The placenta delivered   manually.  The uterus was exteriorized, cleared of all clots and debris with a   moist lap sponge and closed with #1 chromic in a running locked fashion.    Second layer of the same suture was then used to obtain hemostasis.  At that   point, the uterus returned to peritoneal cavity.  The abdomen irrigated,   cleared of all clots and debris.  Hysterotomy checked for hemostasis, which   was found to be adequate.  At that point, the peritoneum was closed with 0   Vicryl.  The muscles reapproximated with a Vicryl stitch.  The fascia closed   with 0 Vicryl in a running fashion.  The subcutaneous layers were checked for   hemostasis, noted to be adequate, reapproximated with a 2-0 plain and the skin   was closed with staples.  Sponge, lap, needle counts were all correct x2 and   the patient is taken to the recovery room in good condition.       ____________________________________     MD JADA BARDALES / SHIKHA    DD:  08/05/2017 05:45:37  DT:  08/05/2017 06:17:18    D#:  [id number]  Job#:  [id number]

## 2017-08-05 NOTE — PROGRESS NOTES
Bedside report received from Bruna MANCIA @ 0800. Oriented patient to the room, introduced the call light & skylight system. ID band & cuddle verified by Katarina MANCIA. Discussed plan of care. Assessment done. Encouraged to call if with need. Will check at intervals.

## 2017-08-05 NOTE — OR SURGEON
Operative Report    PreOp Diagnosis: IUP at term non reassuring FHR    PostOp Diagnosis: same    Procedure(s):  PRIMARY C SECTION    Surgeon(s):  DORYS Conley CNM    Anesthesiologist/Type of Anesthesia:  Anesthesiologist: (Unknown)/Spinal    Surgical Staff:  Circulator: (Unknown)  Scrub Person: (Unknown)    Specimens:  * No specimens in log *    Estimated Blood Loss: 500cc    Findings: male presentation vertex APGAR 8/9  Uterus Normal, Tubes Normal, ovaries Normal      Complications: none        2017 5:27 AM Marisela Romero

## 2017-08-05 NOTE — H&P
"History and Physical      Nicole Fishman is a 26 y.o. female  at 39w4d who presents for IOL    Subjective:   negative  For CTXS.   negative Feels pain   negative for LOF  negative for vaginal bleeding.   positive for fetal movement    ROS: A comprehensive review of systems was negative.    Past Medical History   Diagnosis Date   • Migraine    • H/O bladder infections 2016     No past surgical history on file.  OB History    Para Term  AB SAB TAB Ectopic Multiple Living   1               # Outcome Date GA Lbr Leonardo/2nd Weight Sex Delivery Anes PTL Lv   1 Current                 Social History     Social History   • Marital Status: Single     Spouse Name: N/A   • Number of Children: N/A   • Years of Education: N/A     Occupational History   • Not on file.     Social History Main Topics   • Smoking status: Former Smoker   • Smokeless tobacco: Not on file   • Alcohol Use: No   • Drug Use: No   • Sexual Activity:     Partners: Male     Birth Control/ Protection: Condom     Other Topics Concern   • Not on file     Social History Narrative     Allergies: Review of patient's allergies indicates no known allergies.  No current facility-administered medications for this encounter.    Prenatal care with Dr Weaver starting at 12 weeks with following problems:  Patient Active Problem List    Diagnosis Date Noted   • Labor and delivery, indication for care 2017   • Group B Streptococcus carrier, +RV culture, currently pregnant 07/10/2017   • Chlamydia infection- s/p tx- negative SHELDON 2017   • Encounter for supervision of normal first pregnancy in first trimester 2017   • Increased BMI - 40.6 2017               Objective:      Height 1.499 m (4' 11\"), weight 99.791 kg (220 lb), last menstrual period 10/31/2016, unknown if currently breastfeeding.    General:   no acute distress   Skin:   normal   HEENT:  Neck supple with midline trachea   Lungs:   CTA bilateral "   Heart:   S1, S2 normal, no murmur, click, rub or gallop, regular rate and rhythm, brisk carotid upstroke without bruits, peripheral pulses very brisk, chest is clear without rales or wheezing, no pedal edema, no JVD, no hepatosplenomegaly   Abdomen:   gravid, NT   EFW:  7.5 lbs   Pelvis:  adequate with gynecoid pelvis   FHT:  130 BPM   Uterine Size: S=D   Presentations: Cephalic   Cervix:     Dilation: 2cm    Effacement: 75%    Station:  -2    Consistency: Medium    Position: Middle     Lab Review  Lab:   Blood type: A     Recent Results (from the past 5880 hour(s))   CBC WITH DIFFERENTIAL    Collection Time: 12/07/16  8:57 PM   Result Value Ref Range    WBC 13.1 (H) 4.8 - 10.8 K/uL    RBC 4.39 4.20 - 5.40 M/uL    Hemoglobin 13.1 12.0 - 16.0 g/dL    Hematocrit 39.1 37.0 - 47.0 %    MCV 89.1 81.4 - 97.8 fL    MCH 29.8 27.0 - 33.0 pg    MCHC 33.5 (L) 33.6 - 35.0 g/dL    RDW 43.6 35.9 - 50.0 fL    Platelet Count 389 164 - 446 K/uL    MPV 9.9 9.0 - 12.9 fL    Neutrophils-Polys 66.20 44.00 - 72.00 %    Lymphocytes 26.10 22.00 - 41.00 %    Monocytes 6.20 0.00 - 13.40 %    Eosinophils 0.80 0.00 - 6.90 %    Basophils 0.40 0.00 - 1.80 %    Immature Granulocytes 0.30 0.00 - 0.90 %    Nucleated RBC 0.00 /100 WBC    Neutrophils (Absolute) 8.68 (H) 2.00 - 7.15 K/uL    Lymphs (Absolute) 3.42 1.00 - 4.80 K/uL    Monos (Absolute) 0.81 0.00 - 0.85 K/uL    Eos (Absolute) 0.10 0.00 - 0.51 K/uL    Baso (Absolute) 0.05 0.00 - 0.12 K/uL    Immature Granulocytes (abs) 0.04 0.00 - 0.11 K/uL    NRBC (Absolute) 0.00 K/uL   COMP METABOLIC PANEL    Collection Time: 12/07/16  8:57 PM   Result Value Ref Range    Sodium 135 135 - 145 mmol/L    Potassium 3.7 3.6 - 5.5 mmol/L    Chloride 102 96 - 112 mmol/L    Co2 25 20 - 33 mmol/L    Anion Gap 8.0 0.0 - 11.9    Glucose 93 65 - 99 mg/dL    Bun 9 8 - 22 mg/dL    Creatinine 0.61 0.50 - 1.40 mg/dL    Calcium 8.8 8.4 - 10.2 mg/dL    AST(SGOT) 31 12 - 45 U/L    ALT(SGPT) 40 2 - 50 U/L    Alkaline  Phosphatase 47 30 - 99 U/L    Total Bilirubin 0.4 0.1 - 1.5 mg/dL    Albumin 3.7 3.2 - 4.9 g/dL    Total Protein 7.6 6.0 - 8.2 g/dL    Globulin 3.9 (H) 1.9 - 3.5 g/dL    A-G Ratio 0.9 g/dL   LIPASE    Collection Time: 12/07/16  8:57 PM   Result Value Ref Range    Lipase 29 7 - 58 U/L   URINALYSIS CULTURE, IF INDICATED    Collection Time: 12/07/16  8:57 PM   Result Value Ref Range    Micro Urine Req Microscopic     Color Yellow     Character Hazy (A)     Ph 6.0 5.0-8.0    Glucose Negative Negative mg/dL    Ketones Negative Negative mg/dL    Protein Negative Negative mg/dL    Bilirubin Negative Negative    Nitrite Negative Negative    Leukocyte Esterase Moderate (A) Negative    Occult Blood Trace (A) Negative    Culture Indicated Yes UA Culture   HCG QUANTITATIVE SERUM    Collection Time: 12/07/16  8:57 PM   Result Value Ref Range    Bhcg 3115.0 (H) 0.0 - 10.0 mIU/mL   RH TYPE FOR RHOGAM FROM E.D.    Collection Time: 12/07/16  8:57 PM   Result Value Ref Range    Emergency Department Rh Typing POS     Number Of Rh Doses Indicated ZERO    URINE MICROSCOPIC (W/UA)    Collection Time: 12/07/16  8:57 PM   Result Value Ref Range    -150 (A) /hpf    RBC 0-2 /hpf    Bacteria Few (A) None /hpf    Epithelial Cells Moderate (A) Few /hpf    Mucous Threads Few /hpf   REFRACTOMETER SG    Collection Time: 12/07/16  8:57 PM   Result Value Ref Range    Specific Gravity 1.025    URINE CULTURE(NEW)    Collection Time: 12/07/16  8:57 PM   Result Value Ref Range    Significant Indicator NEG     Source UR     Site Clean Catch     Urine Culture Mixed skin meghna ,000 cfu/mL    ESTIMATED GFR    Collection Time: 12/07/16  8:57 PM   Result Value Ref Range    GFR If African American >60 >60 mL/min/1.73 m 2    GFR If Non African American >60 >60 mL/min/1.73 m 2   POCT IN CLINIC OB SCAN    Collection Time: 01/09/17 11:46 AM   Result Value Ref Range    In Clinic OB Scan       TVS shows a single live IUP 10 weeks by CRL (+) cardiac  activity, YS seen. ART 8/7/2017. normal cervix, normal ovaries. no FF in the CDS   PRENATAL PANEL PLUS HCV HIV AND UA    Collection Time: 01/31/17 11:42 AM   Result Value Ref Range    Micro Urine Req Microscopic     WBC 10.6 4.8 - 10.8 K/uL    RBC 4.47 4.20 - 5.40 M/uL    Hemoglobin 13.3 12.0 - 16.0 g/dL    Hematocrit 41.6 37.0 - 47.0 %    MCV 93.1 81.4 - 97.8 fL    MCH 29.8 27.0 - 33.0 pg    MCHC 32.0 (L) 33.6 - 35.0 g/dL    RDW 47.3 35.9 - 50.0 fL    Platelet Count 359 164 - 446 K/uL    MPV 9.8 9.0 - 12.9 fL    Neutrophils-Polys 74.70 (H) 44.00 - 72.00 %    Lymphocytes 18.40 (L) 22.00 - 41.00 %    Monocytes 5.50 0.00 - 13.40 %    Eosinophils 0.60 0.00 - 6.90 %    Basophils 0.50 0.00 - 1.80 %    Immature Granulocytes 0.30 0.00 - 0.90 %    Nucleated RBC 0.00 /100 WBC    Neutrophils (Absolute) 7.96 (H) 2.00 - 7.15 K/uL    Lymphs (Absolute) 1.96 1.00 - 4.80 K/uL    Monos (Absolute) 0.58 0.00 - 0.85 K/uL    Eos (Absolute) 0.06 0.00 - 0.51 K/uL    Baso (Absolute) 0.05 0.00 - 0.12 K/uL    Immature Granulocytes (abs) 0.03 0.00 - 0.11 K/uL    NRBC (Absolute) 0.00 K/uL    Color Yellow     Character Sl Cloudy (A)     Specific Gravity 1.024 <1.035    Ph 6.5 5.0-8.0    Glucose Negative Negative mg/dL    Ketones Negative Negative mg/dL    Protein Negative Negative mg/dL    Bilirubin Negative Negative    Nitrite Negative Negative    Leukocyte Esterase Small (A) Negative    Occult Blood Negative Negative    Culture Indicated Yes UA Culture    Rubella IgG Antibody 284.50 IU/mL    Syphilis, Treponemal Qual Non Reactive Non Reactive    Hepatitis B Surface Antigen Negative Negative   SEQUENTIAL 1    Collection Time: 01/31/17 11:42 AM   Result Value Ref Range    Results Report     Test Results SEE BELOW     Submit Part 2 Sample Using: Date:     Crown Rump Length 67.0 mm    CRL Scan Date Date:     Sonographer ID: [id number]     Gest. Age on Collection Date 13.0 weeks    Maternal Age At ART 25.9 years    Race      Weight 1st 200 lbs     Number of Fetuses 1     Nuchal Translucency 1.9 mm    NT MoM 1.12     Hcg Value 76.5 IU/mL    Hcg Mom 1.08     TARA-A Value 539.7 ng/mL    TARA-A MoM 0.64     Down Syndrome Screening Risk:     Dsr By Age Age Risk:     Trisomy 18 Screening Risk:     T18 By Age Age Risk:     Interpretation SEE BELOW     T18 Interpretation SEE BELOW     Comment: SEE BELOW     Note: SEE BELOW    HIV ANTIBODIES    Collection Time: 01/31/17 11:42 AM   Result Value Ref Range    HIV Ag/Ab Combo Assay Non Reactive Non Reactive   OP PRENATAL PANEL-BLOOD BANK    Collection Time: 01/31/17 11:42 AM   Result Value Ref Range    ABO Grouping Only A     Rh Grouping Only POS     Antibody Screen Scrn NEG    URINE MICROSCOPIC (W/UA)    Collection Time: 01/31/17 11:42 AM   Result Value Ref Range    WBC 2-5 /hpf    RBC 2-5 (A) /hpf    Bacteria Few (A) None /hpf    Epithelial Cells Few /hpf    Mucous Threads Few /hpf    Ca Oxalate Crystal Few /hpf   URINE CULTURE(NEW)    Collection Time: 01/31/17 11:42 AM   Result Value Ref Range    Significant Indicator NEG     Source UR     Urine Culture Mixed skin meghna 10-50,000 cfu/mL    THINPREP RFLX HPV ASCUS W/CTNG    Collection Time: 02/03/17  9:41 AM   Result Value Ref Range    Cytology Reg See Path Report     Source Cervical     C. trachomatis by PCR POSITIVE (A) Negative    N. gonorrhoeae by PCR Negative Negative   AFP TETRA    Collection Time: 03/23/17 12:47 PM   Result Value Ref Range    AFP Value -Eia 83 ng/mL    AFP MOM Value 1.66     Hcg Value 08054 IU/L    Hcg Mom 1.69     Ue3 Value 2.11 ng/mL    Ue3 Mom 1.01     Interpretation Normal     Maternal Age [age] ART 25.8 yr    Gestational Age Based On US     Gestational Age 20.43 weeks    Insulin Dependent Diabetes No     Race      Multiple Pregnancy One     Bonnie Value -Eia 138 pg/mL    Bonnie Mom Value 0.84     Maternal Weight 200 lbs    Err Maternal Scrn AFP See Note    CHLAMYDIA/GC PCR URINE OR SWAB    Collection Time: 04/14/17 10:22 AM   Result Value Ref  Range    Source genital     C. trachomatis by PCR Negative Negative    N. gonorrhoeae by PCR Negative Negative   T.PALLIDUM AB EIA    Collection Time: 04/28/17 10:31 AM   Result Value Ref Range    Syphilis, Treponemal Qual Non Reactive Non Reactive   HGB    Collection Time: 04/28/17 10:31 AM   Result Value Ref Range    Hemoglobin 11.8 (L) 12.0 - 16.0 g/dL   HCT    Collection Time: 04/28/17 10:31 AM   Result Value Ref Range    Hematocrit 36.7 (L) 37.0 - 47.0 %   GLUCOSE 1HR GESTATIONAL    Collection Time: 04/28/17 10:31 AM   Result Value Ref Range    Glucose, Post Dose 161 (H) 70 - 139 mg/dL   GLUCOSE 3 HR GESTATIONAL    Collection Time: 05/09/17  9:38 AM   Result Value Ref Range    Glucose 2 Hour 88 65 - 155 mg/dL    Baseline Glucose 79 65 - 95 mg/dL    Glucose 3 Hour 60 (L) 65 - 140 mg/dL    Glucose 1 Hour 121 65 - 180 mg/dL   CHLAMYDIA/GC PCR URINE OR SWAB    Collection Time: 07/05/17 10:43 AM   Result Value Ref Range    Source Genital     C. trachomatis by PCR Negative Negative    N. gonorrhoeae by PCR Negative Negative   GRP B STREP, BY PCR (GREER BROTH)    Collection Time: 07/05/17 10:43 AM   Result Value Ref Range    Strep Gp B DNA PCR POSITIVE (A) Negative        Assessment:   Nicole Fishman at 39w4d  Labor status: Not in labor.  Obstetrical history significant for   Patient Active Problem List    Diagnosis Date Noted   • Labor and delivery, indication for care 08/04/2017   • Group B Streptococcus carrier, +RV culture, currently pregnant 07/10/2017   • Chlamydia infection- s/p tx- negative SHELDON 03/01/2017   • Encounter for supervision of normal first pregnancy in first trimester 02/03/2017   • Increased BMI - 40.6 02/03/2017   .      Plan:     Admit to L&D  GBS positive

## 2017-08-05 NOTE — PROGRESS NOTES
"L&D Progress Note    Name:   Nicole Fishman   Date/Time:  8/5/2017 4:02 AM  Gestational Age:  39w5d  Admit Date:   8/4/2017  Admitting Dx:   Pregnancy  GEL  Labor and delivery, indication for care    Subjective:  Uterine contractions: yes  Pain:    no  Complaints:   no   Headache: .  no  Blurred vision:  no   SOB:    no   Nausea/vomiting:  no  Epigastric pain:  no  Fetal movement:  normal  Vaginal bleeding: . no    Objective:   Filed Vitals:    08/04/17 1900 08/04/17 2000 08/05/17 0200   BP:  128/87 107/57   Pulse:  98 86   Height: 1.499 m (4' 11\")     Weight: 99.791 kg (220 lb)       Fetal heart variability: moderate  Fetal Heart Rate decelerations: prolonged declaration with occasional late decel  Fetal Heart Rate accelerations: no  Baseline FHR: 150 per minute  Uterine contractions: regular, every 4 minutes  Cervical: 75% ;  Dilatation .3 ; Station negative1    Fetal position:   Cephalic  Membranes ruptured: no  AROM:  no  Meconium: .  no    IUPC: .   no  FSE .   no    Meds:   Epidural : .  no  Magnesium sulfate: . no  Pitocin: .  yes    Labs:  Recent Results (from the past 72 hour(s))   Hold Blood Bank Specimen (Not Tested)    Collection Time: 08/05/17 12:15 AM   Result Value Ref Range    Holding Tube - Bb DONE    CBC WITH DIFFERENTIAL    Collection Time: 08/05/17 12:15 AM   Result Value Ref Range    WBC 16.7 (H) 4.8 - 10.8 K/uL    RBC 3.99 (L) 4.20 - 5.40 M/uL    Hemoglobin 12.1 12.0 - 16.0 g/dL    Hematocrit 37.3 37.0 - 47.0 %    MCV 93.5 81.4 - 97.8 fL    MCH 30.3 27.0 - 33.0 pg    MCHC 32.4 (L) 33.6 - 35.0 g/dL    RDW 46.9 35.9 - 50.0 fL    Platelet Count 301 164 - 446 K/uL    MPV 9.7 9.0 - 12.9 fL    Neutrophils-Polys 78.80 (H) 44.00 - 72.00 %    Lymphocytes 16.10 (L) 22.00 - 41.00 %    Monocytes 4.10 0.00 - 13.40 %    Eosinophils 0.20 0.00 - 6.90 %    Basophils 0.20 0.00 - 1.80 %    Immature Granulocytes 0.60 0.00 - 0.90 %    Nucleated RBC 0.00 /100 WBC    Neutrophils (Absolute) 13.18 (H) " 2.00 - 7.15 K/uL    Lymphs (Absolute) 2.69 1.00 - 4.80 K/uL    Monos (Absolute) 0.69 0.00 - 0.85 K/uL    Eos (Absolute) 0.04 0.00 - 0.51 K/uL    Baso (Absolute) 0.04 0.00 - 0.12 K/uL    Immature Granulocytes (abs) 0.10 0.00 - 0.11 K/uL    NRBC (Absolute) 0.00 K/uL         Assessment:   Gestational Age:   39w5d  Risk Factors:   group B strep colonizer  Labor State:    Early latent labor.  Pregnancy Complications:   Plan:    Anticipate  for fetal distress delivery type    Patient Active Problem List    Diagnosis Date Noted   • Labor and delivery, indication for care 2017   • Group B Streptococcus carrier, +RV culture, currently pregnant 07/10/2017   • Chlamydia infection- s/p tx- negative SHELDON 2017   • Encounter for supervision of normal first pregnancy in first trimester 2017   • Increased BMI - 40.6 2017       Marisela Romero M.D.

## 2017-08-06 PROCEDURE — 700111 HCHG RX REV CODE 636 W/ 250 OVERRIDE (IP): Performed by: ANESTHESIOLOGY

## 2017-08-06 PROCEDURE — 700102 HCHG RX REV CODE 250 W/ 637 OVERRIDE(OP): Performed by: OBSTETRICS & GYNECOLOGY

## 2017-08-06 PROCEDURE — A9270 NON-COVERED ITEM OR SERVICE: HCPCS | Performed by: OBSTETRICS & GYNECOLOGY

## 2017-08-06 PROCEDURE — 770002 HCHG ROOM/CARE - OB PRIVATE (112)

## 2017-08-06 RX ORDER — ONDANSETRON 2 MG/ML
4 INJECTION INTRAMUSCULAR; INTRAVENOUS EVERY 6 HOURS PRN
Status: DISCONTINUED | OUTPATIENT
Start: 2017-08-06 | End: 2017-08-06

## 2017-08-06 RX ORDER — IBUPROFEN 600 MG/1
600 TABLET ORAL EVERY 6 HOURS PRN
Status: DISCONTINUED | OUTPATIENT
Start: 2017-08-06 | End: 2017-08-07

## 2017-08-06 RX ORDER — DIPHENHYDRAMINE HYDROCHLORIDE 50 MG/ML
25 INJECTION INTRAMUSCULAR; INTRAVENOUS EVERY 6 HOURS PRN
Status: DISCONTINUED | OUTPATIENT
Start: 2017-08-06 | End: 2017-08-08 | Stop reason: HOSPADM

## 2017-08-06 RX ORDER — ONDANSETRON 4 MG/1
4 TABLET, ORALLY DISINTEGRATING ORAL EVERY 6 HOURS PRN
Status: DISCONTINUED | OUTPATIENT
Start: 2017-08-06 | End: 2017-08-08 | Stop reason: HOSPADM

## 2017-08-06 RX ORDER — DIPHENHYDRAMINE HCL 25 MG
25 TABLET ORAL EVERY 6 HOURS PRN
Status: DISCONTINUED | OUTPATIENT
Start: 2017-08-06 | End: 2017-08-08 | Stop reason: HOSPADM

## 2017-08-06 RX ORDER — OXYCODONE HYDROCHLORIDE AND ACETAMINOPHEN 5; 325 MG/1; MG/1
2 TABLET ORAL EVERY 4 HOURS PRN
Status: DISCONTINUED | OUTPATIENT
Start: 2017-08-06 | End: 2017-08-07

## 2017-08-06 RX ORDER — ONDANSETRON 4 MG/1
4 TABLET, ORALLY DISINTEGRATING ORAL EVERY 6 HOURS PRN
Status: DISCONTINUED | OUTPATIENT
Start: 2017-08-06 | End: 2017-08-06

## 2017-08-06 RX ORDER — OXYCODONE HYDROCHLORIDE AND ACETAMINOPHEN 5; 325 MG/1; MG/1
1 TABLET ORAL EVERY 4 HOURS PRN
Status: DISCONTINUED | OUTPATIENT
Start: 2017-08-06 | End: 2017-08-07

## 2017-08-06 RX ORDER — MORPHINE SULFATE 4 MG/ML
4 INJECTION, SOLUTION INTRAMUSCULAR; INTRAVENOUS
Status: DISCONTINUED | OUTPATIENT
Start: 2017-08-06 | End: 2017-08-08 | Stop reason: HOSPADM

## 2017-08-06 RX ORDER — ONDANSETRON 2 MG/ML
4 INJECTION INTRAMUSCULAR; INTRAVENOUS EVERY 6 HOURS PRN
Status: DISCONTINUED | OUTPATIENT
Start: 2017-08-06 | End: 2017-08-08 | Stop reason: HOSPADM

## 2017-08-06 RX ORDER — OXYCODONE AND ACETAMINOPHEN 10; 325 MG/1; MG/1
1 TABLET ORAL EVERY 4 HOURS PRN
Status: DISCONTINUED | OUTPATIENT
Start: 2017-08-06 | End: 2017-08-06

## 2017-08-06 RX ORDER — OXYCODONE HYDROCHLORIDE AND ACETAMINOPHEN 5; 325 MG/1; MG/1
1 TABLET ORAL EVERY 4 HOURS PRN
Status: DISCONTINUED | OUTPATIENT
Start: 2017-08-06 | End: 2017-08-06

## 2017-08-06 RX ADMIN — KETOROLAC TROMETHAMINE 30 MG: 30 INJECTION, SOLUTION INTRAMUSCULAR at 08:29

## 2017-08-06 RX ADMIN — IBUPROFEN 600 MG: 600 TABLET, FILM COATED ORAL at 19:50

## 2017-08-06 RX ADMIN — OXYCODONE HYDROCHLORIDE AND ACETAMINOPHEN 2 TABLET: 5; 325 TABLET ORAL at 19:50

## 2017-08-06 RX ADMIN — OXYCODONE HYDROCHLORIDE AND ACETAMINOPHEN 1 TABLET: 10; 325 TABLET ORAL at 08:26

## 2017-08-06 RX ADMIN — OXYCODONE HYDROCHLORIDE AND ACETAMINOPHEN 1 TABLET: 10; 325 TABLET ORAL at 15:05

## 2017-08-06 RX ADMIN — Medication 1 TABLET: at 08:26

## 2017-08-06 ASSESSMENT — PAIN SCALES - GENERAL
PAINLEVEL_OUTOF10: 0
PAINLEVEL_OUTOF10: 2
PAINLEVEL_OUTOF10: 7
PAINLEVEL_OUTOF10: 1
PAINLEVEL_OUTOF10: 7
PAINLEVEL_OUTOF10: 1
PAINLEVEL_OUTOF10: 7

## 2017-08-06 NOTE — CARE PLAN
Problem: Altered physiologic condition related to postoperative  delivery  Goal: Patient physiologically stable as evidenced by normal lochia, palpable uterine involution and vital signs within normal limits  Outcome: PROGRESSING AS EXPECTED  VSS. Fundus firm with light lochia. Patient educated on when to pull emergency call light.     Problem: Potential for postpartum infection related to surgical incision, compromised uterine condition, urinary tract or respiratory compromise  Goal: Patient will be afebrile and free from signs and symptoms of infection  Outcome: PROGRESSING AS EXPECTED  Pt remains afebrile. No signs or symptoms of infection. Dressing is CDI.

## 2017-08-06 NOTE — CONSULTS
Initial visit. Observing MOB attempting to latch infant via cross cradle hold. Infant awake, not vigorous at the breast. Assisted with some nipple ridging for infant to latch, and does sustain latch with a few sucks. Will fall asleep at the breast. Plan to breastfeed every 2-3 hours for total of 10 minutes, supplement with donor breastmilk, and to pump with HG pump afterwards. MOB does have [facility] insurance, and recommended to rent HG pump to help establish milk supply. When milk supply is established, then can switch to personal pump from [facility]. MOB aware of outpatient lactation resources. Discussed plan with GAVINO Seaman. Encouraged to call for assistance as needed.

## 2017-08-06 NOTE — CARE PLAN
Problem: Potential for postpartum infection related to surgical incision, compromised uterine condition, urinary tract or respiratory compromise  Goal: Patient will be afebrile and free from signs and symptoms of infection  Outcome: PROGRESSING AS EXPECTED  No signs or symptoms of infection noted    Problem: Alteration in comfort related to surgical incision and/or after birth pains  Goal: Patient is able to ambulate, care for self and infant with acceptable pain level  Outcome: PROGRESSING AS EXPECTED  Taking pain meds with adequate relief as stated by pt.

## 2017-08-06 NOTE — CONSULTS
Follow up visit. Mother reports baby latching better, no breast/nipple pain or trauma. She has been attempting to BF, supplementing per GL, and then pumping q 3 hours. Baby was being supplemented with 20mL DBM per feed but only wanted 10ml at last feeding. Observed feeding, baby is alert rooting and vigorous at breast. Assisted in cross cradle positioning, helped to obtain deeper asymmetric latch. Mom says they have HTH, understands her breastfeeding resources, and will be getting a HGP for home usage if needed. Encouraged her to call if she needs any help with BF. Written resources given. Multiple family members present at bedside.

## 2017-08-06 NOTE — PROGRESS NOTES
Post Partum Progress Note    Name:   Nicole Fishman   Date/Time:  2017 - 7:37 AM  Chief Admitting Dx:  Pregnancy  GEL  Labor and delivery, indication for care  Fetal intolerence to Labor  Delivery Type:   for fetal distress  Post-Op/Post Partum Days #:  1    Subjective:  Abdominal pain: no  Ambulating:   yes  Tolerating liquids:  yes  Tolerating food:  yes common adult  Flatus:   yes  BM:    no  Bleeding:   without any bleeding  Voiding:   yes  Dizziness:   no  Feeding:   breast    Filed Vitals:    17 2100 17 0000 17 0400 17 0800   BP:  122/69 102/59 105/55   Pulse: 96 94 90 90   Temp:  36.9 °C (98.4 °F) 36.6 °C (97.9 °F) 36.6 °C (97.8 °F)   Resp: 16 22 20 16   Height:       Weight:       SpO2: 93% 94% 94% 96%       Exam:  Breast: Tenderness no  Abdomen: Abdomen soft, non-tender. BS normal. No masses,  No organomegaly  Fundal Tenderness:  no  Fundus Firm: yes  Incision: dry and intact  Below umbilicus: yes  Perineum: perineum intact  Lochia: mild  Extremities: Normal extremities, peripheral pulses and reflexes normal    Meds:  Current Facility-Administered Medications   Medication Dose   • oxycodone-acetaminophen (PERCOCET) 5-325 MG per tablet 1 Tab  1 Tab   • oxycodone-acetaminophen (PERCOCET-10)  MG per tablet 1 Tab  1 Tab   • morphine (pf) 4 mg/ml injection 4 mg  4 mg   • ondansetron (ZOFRAN) syringe/vial injection 4 mg  4 mg    Or   • ondansetron (ZOFRAN ODT) dispertab 4 mg  4 mg   • diphenhydrAMINE (BENADRYL) tablet/capsule 25 mg  25 mg    Or   • diphenhydrAMINE (BENADRYL) injection 25 mg  25 mg   • oxytocin (PITOCIN) infusion (for postpartum)  2,000 mL/hr    Followed by   • oxytocin (PITOCIN) infusion (for postpartum)   mL/hr   • LR infusion     • PRN oxytocin (PITOCIN) (20 Units/1000 mL) PRN for excessive uterine bleeding - See Admin Instr  125-999 mL/hr   • misoprostol (CYTOTEC) tablet 600 mcg  600 mcg   • methylergonovine (METHERGINE)  injection 0.2 mg  0.2 mg   • carboPROST (HEMABATE) injection 250 mcg  250 mcg   • docusate sodium (COLACE) capsule 100 mg  100 mg   • bisacodyl (DULCOLAX) suppository 10 mg  10 mg   • prenatal plus vitamin (STUARTNATAL 1+1) 27-1 MG tablet 1 Tab  1 Tab   • simethicone (MYLICON) chewable tab 80 mg  80 mg   • ketorolac (TORADOL) injection 30 mg  30 mg       Labs:   Recent Labs      17   0015  17   1646   WBC  16.7*  18.1*   RBC  3.99*  3.73*   HEMOGLOBIN  12.1  11.1*   HEMATOCRIT  37.3  33.5*   MCV  93.5  89.8   MCH  30.3  29.8   MCHC  32.4*  33.1*   RDW  46.9  44.9   PLATELETCT  301  291   MPV  9.7  9.4       Assessment:  Chief Admitting Dx:  Pregnancy  GEL  Labor and delivery, indication for care  Fetal intolerence to Labor  Delivery Type:   for fetal distress  Tubal Ligation:  no    Plan:  Continue routine post partum care.  Ambulate  Breast feed - lactation consult  Hafsa Charles M.D.

## 2017-08-06 NOTE — PROGRESS NOTES
0320: Patient states she feels pressure in abdomen. Patient only voided 100 cc post mayfield removal. Bladder scan ordered.     0340: Traction stated that they were not able to do bladder scan because patient has dressing in place    0345: Encouraged patient to try and void to use glenn-bottle and/or turn on shower water to see if it helps her void.     0357: Patient voided 100 cc's.

## 2017-08-06 NOTE — PROGRESS NOTES
Received bedside report from GAVINO Antonio. Patient sitting up in chair eating dinner. Rates pain 1/10 but okay. Will call when needing pain medication. Pulse oximeter in place. Martínez was removed at 1745, patient knows she needs to void by 2345, hat left in room to measure output. Whiteboards updated, POC discussed. Call light within reach patient encouraged to call with any needs and or concerns.     2145: Patient assessed, assessment WDL. Patient started on a pump. Discussed cleaning parts and settings on pump. Patient encouraged to pump q3 hrs after attempting to BF infant.

## 2017-08-07 PROCEDURE — A9270 NON-COVERED ITEM OR SERVICE: HCPCS | Performed by: OBSTETRICS & GYNECOLOGY

## 2017-08-07 PROCEDURE — 700102 HCHG RX REV CODE 250 W/ 637 OVERRIDE(OP): Performed by: OBSTETRICS & GYNECOLOGY

## 2017-08-07 PROCEDURE — 770002 HCHG ROOM/CARE - OB PRIVATE (112)

## 2017-08-07 RX ORDER — OXYCODONE AND ACETAMINOPHEN 10; 325 MG/1; MG/1
1 TABLET ORAL EVERY 4 HOURS PRN
Status: DISCONTINUED | OUTPATIENT
Start: 2017-08-07 | End: 2017-08-08 | Stop reason: HOSPADM

## 2017-08-07 RX ORDER — IBUPROFEN 600 MG/1
600 TABLET ORAL EVERY 6 HOURS PRN
Status: DISCONTINUED | OUTPATIENT
Start: 2017-08-07 | End: 2017-08-08 | Stop reason: HOSPADM

## 2017-08-07 RX ORDER — OXYCODONE HYDROCHLORIDE AND ACETAMINOPHEN 5; 325 MG/1; MG/1
1 TABLET ORAL EVERY 4 HOURS PRN
Status: DISCONTINUED | OUTPATIENT
Start: 2017-08-07 | End: 2017-08-08 | Stop reason: HOSPADM

## 2017-08-07 RX ADMIN — IBUPROFEN 600 MG: 600 TABLET, FILM COATED ORAL at 05:45

## 2017-08-07 RX ADMIN — OXYCODONE HYDROCHLORIDE AND ACETAMINOPHEN 2 TABLET: 5; 325 TABLET ORAL at 05:45

## 2017-08-07 RX ADMIN — IBUPROFEN 600 MG: 600 TABLET, FILM COATED ORAL at 13:25

## 2017-08-07 RX ADMIN — IBUPROFEN 600 MG: 600 TABLET, FILM COATED ORAL at 20:24

## 2017-08-07 RX ADMIN — Medication 1 TABLET: at 08:26

## 2017-08-07 RX ADMIN — OXYCODONE HYDROCHLORIDE AND ACETAMINOPHEN 1 TABLET: 10; 325 TABLET ORAL at 13:25

## 2017-08-07 RX ADMIN — OXYCODONE HYDROCHLORIDE AND ACETAMINOPHEN 2 TABLET: 5; 325 TABLET ORAL at 00:07

## 2017-08-07 ASSESSMENT — PAIN SCALES - GENERAL
PAINLEVEL_OUTOF10: 1
PAINLEVEL_OUTOF10: 6
PAINLEVEL_OUTOF10: 2
PAINLEVEL_OUTOF10: 5
PAINLEVEL_OUTOF10: 6
PAINLEVEL_OUTOF10: 5
PAINLEVEL_OUTOF10: 2

## 2017-08-07 NOTE — CARE PLAN
Problem: Potential for postpartum infection related to surgical incision, compromised uterine condition, urinary tract or respiratory compromise  Goal: Patient will be afebrile and free from signs and symptoms of infection  Outcome: PROGRESSING AS EXPECTED  Patient has no signs/symptoms of infection.  Vital signs stable. Ambulating without difficulty    Problem: Alteration in comfort related to surgical incision and/or after birth pains  Goal: Patient is able to ambulate, care for self and infant with acceptable pain level  Outcome: PROGRESSING AS EXPECTED  Patient states pain control is adequate

## 2017-08-07 NOTE — CARE PLAN
Problem: Altered physiologic condition related to postoperative  delivery  Goal: Patient physiologically stable as evidenced by normal lochia, palpable uterine involution and vital signs within normal limits  Outcome: PROGRESSING AS EXPECTED  Pt's vs stable, fundus is firm and light lochia. Will continue to monitor.    Problem: Potential knowledge deficit related to lack of understanding of self and  care  Goal: Patient will demonstrate ability to care for self and infant  Outcome: PROGRESSING AS EXPECTED  Pt demonstrates appropriate care for self and infant. Encouraged to call for assistance or with questions and concerns.

## 2017-08-08 VITALS
SYSTOLIC BLOOD PRESSURE: 128 MMHG | WEIGHT: 220 LBS | TEMPERATURE: 98.1 F | BODY MASS INDEX: 44.35 KG/M2 | OXYGEN SATURATION: 96 % | HEART RATE: 79 BPM | HEIGHT: 59 IN | RESPIRATION RATE: 18 BRPM | DIASTOLIC BLOOD PRESSURE: 69 MMHG

## 2017-08-08 PROCEDURE — A9270 NON-COVERED ITEM OR SERVICE: HCPCS | Performed by: OBSTETRICS & GYNECOLOGY

## 2017-08-08 PROCEDURE — 700112 HCHG RX REV CODE 229: Performed by: OBSTETRICS & GYNECOLOGY

## 2017-08-08 PROCEDURE — 700102 HCHG RX REV CODE 250 W/ 637 OVERRIDE(OP): Performed by: OBSTETRICS & GYNECOLOGY

## 2017-08-08 RX ORDER — OXYCODONE HYDROCHLORIDE AND ACETAMINOPHEN 5; 325 MG/1; MG/1
1 TABLET ORAL EVERY 4 HOURS PRN
Qty: 30 TAB | Refills: 0 | Status: SHIPPED | OUTPATIENT
Start: 2017-08-08 | End: 2019-01-04

## 2017-08-08 RX ORDER — IBUPROFEN 600 MG/1
600 TABLET ORAL EVERY 6 HOURS PRN
Qty: 30 TAB | Refills: 1 | Status: SHIPPED | OUTPATIENT
Start: 2017-08-08 | End: 2019-01-07

## 2017-08-08 RX ORDER — ASCORBIC ACID, CALCIUM CITRATE, IRON, CHOLECALCIFEROL, PYRIDOXINE HYDROCHLORIDE, AND FOLIC ACID 20-1-25 MG
3 KIT ORAL DAILY
Qty: 30 EACH | Refills: 4 | Status: SHIPPED | OUTPATIENT
Start: 2017-08-08 | End: 2019-01-04

## 2017-08-08 RX ADMIN — IBUPROFEN 600 MG: 600 TABLET, FILM COATED ORAL at 05:20

## 2017-08-08 RX ADMIN — DOCUSATE SODIUM 100 MG: 100 CAPSULE ORAL at 08:35

## 2017-08-08 RX ADMIN — OXYCODONE HYDROCHLORIDE AND ACETAMINOPHEN 1 TABLET: 5; 325 TABLET ORAL at 05:20

## 2017-08-08 RX ADMIN — OXYCODONE HYDROCHLORIDE AND ACETAMINOPHEN 1 TABLET: 5; 325 TABLET ORAL at 00:08

## 2017-08-08 RX ADMIN — Medication 1 TABLET: at 08:35

## 2017-08-08 ASSESSMENT — PAIN SCALES - GENERAL
PAINLEVEL_OUTOF10: 2
PAINLEVEL_OUTOF10: 2
PAINLEVEL_OUTOF10: 3
PAINLEVEL_OUTOF10: 6

## 2017-08-08 NOTE — PROGRESS NOTES
Bedside report received, assumed care of pt. Assessment complete, VSS, fundus firm, lochia light. Pt voiding without difficulty. Bonding well with infant. Pt states pain is being well controlled and will call for PRN pain meds as needed.  Pt is breastfeeding, followed by pumping. POC discussed with pt and family, questions answered, call light within reach.

## 2017-08-08 NOTE — PROGRESS NOTES
A bedside report received from Tasha MANCIA @ the change of shift. Assumed care. Discussed plan of care especially on managing pain. Assessment done. Removed staples aseptically, steri-strips in placed. Encouraged to call if with needs. Will check at intervals.

## 2017-08-08 NOTE — PROGRESS NOTES
Discharge instructions given to pt, questions answered, pt verbalized understanding.  Bands verified with MOB

## 2017-08-08 NOTE — CONSULTS
Progression to breastfeeding discussed with mother. Outlined the supportive measures that should be in place for now, to include skin to skin and other basic strategies, hand expression and intrinsic factors (smell, touch, sight and visualization). Encouraged parents to wake baby every few hours and stimulate her to provide opportunities to learn, explore and practice techniques. Plan for this couplet: Pump every 2-3 hours to stimulate milk production (with Hospital Grade Pump). Hand Expression reviewed and encouraged to practice technique.Feed baby any colostrum that is expressed, referring to appropriate supplement guidelines. Use formula per parent preference as needed to augment amount of supplement if mothers own milk an inadequate amount. Mother may choose to continue latching practice in between pumping, hand expressing and bottle supplementing.Follow up in out-patient center in next few days for evaluation of mother and baby.Volume guidelines discussed to ensure appropriate feeding amounts for baby every 3 hours.

## 2017-08-08 NOTE — DISCHARGE INSTRUCTIONS
POSTPARTUM DISCHARGE INSTRUCTIONS FOR MOM    YOB: 1990   Age: 26 y.o.               Admit Date: 2017     Discharge Date: 2017  Attending Doctor:  Hafsa Monsivais*                  Allergies:  Review of patient's allergies indicates no known allergies.    Discharged to home by car. Discharged via wheelchair, hospital escort: Yes.  Special equipment needed: Not Applicable  Belongings with: Personal  Be sure to schedule a follow-up appointment with your primary care doctor or any specialists as instructed.     Discharge Plan:   Diet Plan: Discussed  Activity Level: Discussed  Confirmed Follow up Appointment: Patient to Call and Schedule Appointment  Confirmed Symptoms Management: Discussed  Medication Reconciliation Updated: Yes  Influenza Vaccine Indication: Indicated: Not available from distributor/    REASONS TO CALL YOUR OBSTETRICIAN:  1.   Persistent fever or shaking chills (Temperature higher than 100.4)  2.   Heavy bleeding (soaking more than 1 pad per hour); Passing clots  3.   Foul odor from vagina  4.   Mastitis (Breast infection; breast pain, chills, fever, redness)  5.   Urinary pain, burning or frequency  6.   Abdominal incision infection  7.   Severe depression longer than 24 hours    HAND WASHING  · Prior to handling the baby.  · Before breastfeeding or bottle feeding baby.  · After using the bathroom or changing the baby's diaper.    WOUND CARE  Ask your physician for additional care instructions.  In general:    ·  Incision:      · Keep clean and dry.    · Do NOT lift anything heavier than your baby for up to 6 weeks.    · There should not be any opening or pus.      VAGINAL CARE  · Nothing inside vagina for 6 weeks: no sexual intercourse, tampons or douching.  · Bleeding may continue for 2-4 weeks.  Amount may vary.    · Call your physician for heavy bleeding which means soaking more than 1 pad per hour    BIRTH CONTROL  · It is possible to become  "pregnant at any time after delivery and while breastfeeding.  · Plan to discuss a method of birth control with your physician at your follow up visit. visit.    DIET AND ELIMINATION  · Eating more fiber (bran cereal, fruits, and vegetables) and drinking plenty of fluids will help to avoid constipation.  · Urinary frequency after childbirth is normal.    POSTPARTUM BLUES  During the first few days after birth, you may experience a sense of the \"blues\" which may include impatience, irritability or even crying.  These feeling come and go quickly.  However, as many as 1 in 10 women experience emotional symptoms known as postpartum depression.    Postpartum depression:  May start as early as the second or third day after delivery or take several weeks or months to develop.  Symptoms of \"blues\" are present, but are more intense:  Crying spells; loss of appetite; feelings of hopelessness or loss of control; fear of touching the baby; over concern or no concern at all about the baby; little or no concern about your own appearance/caring for yourself; and/or inability to sleep or excessive sleeping.  Contact your physician if you are experiencing any of these symptoms.    Crisis Hotline:  · [place] Crisis Hotline:  3-355-CEELZBA  Or 1-119.606.1918  · [place] Crisis Hotline:  1-395.503.9514  Or 708-908-9130    PREVENTING SHAKEN BABY:  If you are angry or stressed, PUT THE BABY IN THE CRIB, step away, take some deep breaths, and wait until you are calm to care for the baby.  DO NOT SHAKE THE BABY.  You are not alone, call a supporter for help.    · Crisis [facility] Center 24/7 crisis line 208-294-6054 or 1-939.553.4759  · You can also text them, text \"ANSWER\" to 802903    QUIT SMOKING/TOBACCO USE:  I understand the use of any tobacco products increases my chance of suffering from future heart disease and could cause other illnesses which may shorten my life. Quitting the use of tobacco products is the single most important thing I " can do to improve my health. For further information on smoking / tobacco cessation call a Toll Free Quit Line at 1-190.331.7631 (*National Cancer [place]) or 1-733.920.1006 (American Lung Association) or you can access the web based program at www.lungusa.org.    · [place] Tobacco Users Help Line:  (639) 783-8916       Toll Free: 1-671.260.2986  · Quit Tobacco Program [facility] (352)284-9558    DEPRESSION / SUICIDE RISK:  As you are discharged from this [facility], it is important to learn how to keep safe from harming yourself.    Recognize the warning signs:  · Abrupt changes in personality, positive or negative- including increase in energy   · Giving away possessions  · Change in eating patterns- significant weight changes-  positive or negative  · Change in sleeping patterns- unable to sleep or sleeping all the time   · Unwillingness or inability to communicate  · Depression  · Unusual sadness, discouragement and loneliness  · Talk of wanting to die  · Neglect of personal appearance   · Rebelliousness- reckless behavior  · Withdrawal from people/activities they love  · Confusion- inability to concentrate     If you or a loved one observes any of these behaviors or has concerns about self-harm, here's what you can do:  · Talk about it- your feelings and reasons for harming yourself  · Remove any means that you might use to hurt yourself (examples: pills, rope, extension cords, firearm)  · Get professional help from the community (Mental Health, Substance Abuse, psychological counseling)  · Do not be alone:Call your Safe Contact- someone whom you trust who will be there for you.  · Call your local CRISIS HOTLINE 510-4151 or 684-505-2929  · Call your local Children's Mobile Crisis Response Team [place] (865) 147-6441 or www.[employer]  · Call the toll free National Suicide Prevention Hotlines   · National Suicide Prevention Lifeline 464-030-SCXL (0247)  · National  [facility] 800-SUICIDE (252-9105)    DISCHARGE SURVEY:  Thank you for choosing [facility].  We hope we provided you with very good care.  You may be receiving a survey in the mail.  Please fill it out.  Your opinion is valuable to us.    ADDITIONAL EDUCATIONAL MATERIALS GIVEN TO PATIENT:        My signature on this form indicates that:  1.  I have reviewed and understand the above information  2.  My questions regarding this information have been answered to my satisfaction.  3.  I have formulated a plan with my discharge nurse to obtain my prescribed medication for home.

## 2017-08-08 NOTE — DISCHARGE SUMMARY
Discharge Summary:      Nicole Fishman      Admit Date:   2017  Discharge Date:  2017     Admitting diagnosis:  Pregnancy  GEL  Labor and delivery, indication for care  Fetal intolerence to Labor  Discharge Diagnosis: Status post  for non-reassuring FHT.  Pregnancy Complications: group B strep (untreated)  Tubal Ligation:  no        History:  Past Medical History   Diagnosis Date   • Migraine    • H/O bladder infections    • Pregnant      OB History    Para Term  AB SAB TAB Ectopic Multiple Living   1               # Outcome Date GA Lbr Leonardo/2nd Weight Sex Delivery Anes PTL Lv   1 Current                    Review of patient's allergies indicates no known allergies.  Patient Active Problem List    Diagnosis Date Noted   • Labor and delivery, indication for care 2017   • Group B Streptococcus carrier, +RV culture, currently pregnant 07/10/2017   • Chlamydia infection- s/p tx- negative SHELDON 2017   • Encounter for supervision of normal first pregnancy in first trimester 2017   • Increased BMI - 40.6 2017        Hospital Course:   26 y.o. , now para 1, was admitted with the above mentioned diagnosis, underwent Primary  fetal distress, . Patient postpartum course was unremarkable, with progressive advancement in diet , ambulation and toleration of oral analgesia. Patient without complaints today and desires discharge.      Filed Vitals:    17 0817   BP: 105/55 127/74 107/83 132/90   Pulse: 90 110 85 110   Temp: 36.6 °C (97.8 °F) 36.8 °C (98.3 °F) 36.7 °C (98 °F) 36.8 °C (98.2 °F)   Resp: 16 18 16 19   Height:       Weight:       SpO2: 96% 92% 96% 95%       Current Facility-Administered Medications   Medication Dose   • ibuprofen (MOTRIN) tablet 600 mg  600 mg   • oxycodone-acetaminophen (PERCOCET-10)  MG per tablet 1 Tab  1 Tab   • oxycodone-acetaminophen (PERCOCET) 5-325 MG  per tablet 1 Tab  1 Tab   • oxytocin (PITOCIN) infusion (for postpartum)   mL/hr   • morphine (pf) 4 mg/ml injection 4 mg  4 mg   • ondansetron (ZOFRAN) syringe/vial injection 4 mg  4 mg    Or   • ondansetron (ZOFRAN ODT) dispertab 4 mg  4 mg   • diphenhydrAMINE (BENADRYL) tablet/capsule 25 mg  25 mg    Or   • diphenhydrAMINE (BENADRYL) injection 25 mg  25 mg   • oxytocin (PITOCIN) infusion (for postpartum)   mL/hr   • LR infusion     • PRN oxytocin (PITOCIN) (20 Units/1000 mL) PRN for excessive uterine bleeding - See Admin Instr  125-999 mL/hr   • misoprostol (CYTOTEC) tablet 600 mcg  600 mcg   • methylergonovine (METHERGINE) injection 0.2 mg  0.2 mg   • carboPROST (HEMABATE) injection 250 mcg  250 mcg   • docusate sodium (COLACE) capsule 100 mg  100 mg   • bisacodyl (DULCOLAX) suppository 10 mg  10 mg   • prenatal plus vitamin (STUARTNATAL 1+1) 27-1 MG tablet 1 Tab  1 Tab   • simethicone (MYLICON) chewable tab 80 mg  80 mg       Exam:  Breast Exam: Inspection negative. No nipple discharge or bleeding. No masses or nodularity palpable  Abdomen: Abdomen soft, non-tender. BS normal. No masses,  No organomegaly  Fundus Non Tender: yes  Incision: dry and intact, healing well with good reapproximation, no evidence of infection, separation or keloid formation.  Perineum: perineum intact  Extremity: extremities, peripheral pulses and reflexes normal     Labs:  Recent Labs      08/05/17   1646   WBC  18.1*   RBC  3.73*   HEMOGLOBIN  11.1*   HEMATOCRIT  33.5*   MCV  89.8   MCH  29.8   MCHC  33.1*   RDW  44.9   PLATELETCT  291   MPV  9.4        Activity:   Discharge to home  Pelvic Rest x 6 weeks    Assessment:  normal postpartum course  Discharge Assessment: No breast redness or severe pain of breast     Follow up: [facility] or [facility] in 5 weeks for vaginal ; 1 week for incision check.      Discharge Meds:   Current Outpatient Prescriptions   Medication Sig Dispense Refill   • ibuprofen (MOTRIN) 600 MG  Tab Take 1 Tab by mouth every 6 hours as needed for Moderate Pain (cramping). 30 Tab 1   • oxycodone-acetaminophen (PERCOCET) 5-325 MG Tab Take 1 Tab by mouth every four hours as needed for Moderate Pain or Severe Pain. 30 Tab 0   • Prenat w/o A FeCbnFeGlu-FA &B6 (CITRANATAL B-CALM) 20-1 & 25 (2) MG Misc Take 3 tablet by mouth every day. 30 Each 4       Giovani Godoy M.D.

## 2017-08-08 NOTE — PROGRESS NOTES
Patient off unit with infant and FOB and hospital escort at 1118 in wheelchair. Discharge instructions reviewed and signed; copy given to patient and placed in chart. Car seat check completed. F/U appointment discussed. Patient verbalizes understanding.

## 2017-08-08 NOTE — PROGRESS NOTES
Late entry:  8/4/2017  2000: pt to room for scheduled op gel.  efm and toco applied. Vss.  Late decelerations noted.  sve 2/70/-2.   2100: Report to dr. Romero. Dr. salazar review tracing. Orders to not place gel as pt's cervix is favorable and to watch fetal heart tracing for another hour.    2200:occasional late decelerations and variable decelerations noted.  Report to dr. Romero.  Orders to start iol whenever possible.    2345: report to [facility] rn.

## 2017-08-15 ENCOUNTER — POST PARTUM (OUTPATIENT)
Dept: OBGYN | Facility: CLINIC | Age: 27
End: 2017-08-15
Payer: COMMERCIAL

## 2017-08-15 VITALS
DIASTOLIC BLOOD PRESSURE: 76 MMHG | WEIGHT: 213 LBS | BODY MASS INDEX: 42.94 KG/M2 | HEIGHT: 59 IN | SYSTOLIC BLOOD PRESSURE: 120 MMHG

## 2017-08-15 DIAGNOSIS — O99.820 GROUP B STREPTOCOCCUS CARRIER, +RV CULTURE, CURRENTLY PREGNANT: ICD-10-CM

## 2017-08-15 PROCEDURE — 99024 POSTOP FOLLOW-UP VISIT: CPT | Performed by: OBSTETRICS & GYNECOLOGY

## 2017-08-15 NOTE — MR AVS SNAPSHOT
"        Nicole Fishman   8/15/2017 2:15 PM   Post Partum   MRN: [id number]    Department:  [facility]   Dept Phone:  [phone]    Description:  Female : 1990   Provider:  Marisela Romero M.D.           Reason for Visit     Postpartum care           Allergies as of 8/15/2017     No Known Allergies      You were diagnosed with     Group B Streptococcus carrier, +RV culture, currently pregnant   [[id number]]         Vital Signs     Blood Pressure Height Weight Body Mass Index Last Menstrual Period Breastfeeding?    120/76 mmHg 1.499 m (4' 11\") 96.616 kg (213 lb) 43.00 kg/m2 10/31/2016 Yes    Smoking Status                   Former Smoker           Basic Information     Date Of Birth Sex Race Ethnicity Preferred Language    1990 Female  Non- English      Your appointments     Sep 21, 2017  9:45 AM   Post Partum / Surgery with Marisela Romero M.D.   [facility] [address], [address]  [facility] [id number] [phone]              Problem List              ICD-10-CM Priority Class Noted - Resolved    Encounter for supervision of normal first pregnancy in first trimester Z34.01   2/3/2017 - Present    Increased BMI - 40.6 R63.8   2/3/2017 - Present    Chlamydia infection- s/p tx- negative SHELDON A74.9   3/1/2017 - Present    Group B Streptococcus carrier, +RV culture, currently pregnant O99.820   7/10/2017 - Present    Labor and delivery, indication for care O75.9   2017 - Present      Health Maintenance        Date Due Completion Dates    IMM HEP B VACCINE (1 of 3 - Primary Series) 1990 ---    IMM HEP A VACCINE (1 of 2 - Standard Series) 10/2/1992 ---    IMM HPV VACCINE (1 of 3 - Female 3 Dose Series) 10/2/2002 ---    IMM VARICELLA (CHICKENPOX) VACCINE (1 of 2 - 2 Dose Adolescent Series) 10/2/2004 ---    IMM INFLUENZA (1) 2017 10/26/2015    PAP SMEAR 2/3/2020 2/3/2017    IMM DTaP/Tdap/Td Vaccine (2 - Td) " 7/25/2027 7/25/2017            Current Immunizations     Influenza Vaccine Quad Inj (Preserved) 10/26/2015    Tdap Vaccine 7/25/2017      Below and/or attached are the medications your provider expects you to take. Review all of your home medications and newly ordered medications with your provider and/or pharmacist. Follow medication instructions as directed by your provider and/or pharmacist. Please keep your medication list with you and share with your provider. Update the information when medications are discontinued, doses are changed, or new medications (including over-the-counter products) are added; and carry medication information at all times in the event of emergency situations     Allergies:  No Known Allergies          Medications  Valid as of: August 15, 2017 -  2:54 PM    Generic Name Brand Name Tablet Size Instructions for use    Ibuprofen (Tab) MOTRIN 600 MG Take 1 Tab by mouth every 6 hours as needed for Moderate Pain (cramping).        Misc. Devices (Misc) Breast Pump  1 Each by Other route every day. Double Electric Pump, Medically Necessary   Z39.1        Oxycodone-Acetaminophen (Tab) PERCOCET 5-325 MG Take 1 Tab by mouth every four hours as needed for Moderate Pain or Severe Pain.        Prenat w/o A FeCbnFeGlu-FA &B6 (Misc) CITRANATAL B-CALM 20-1 & 25 (2) MG Take 3 tablet by mouth every day.        .                 Medicines prescribed today were sent to:     [facility] [address] [place] [address] (), [place] [id number] () [place] [address]    Phone: [phone] Fax: [phone]    Open 24 Hours?: No      Medication refill instructions:       If your prescription bottle indicates you have medication refills left, it is not necessary to call your provider’s office. Please contact your pharmacy and they will refill your medication.    If your prescription bottle indicates you do not have any refills left, you may request refills at any time through one of the following  ways: The online [employer] system (except Urgent Care), by calling your provider’s office, or by asking your pharmacy to contact your provider’s office with a refill request. Medication refills are processed only during regular business hours and may not be available until the next business day. Your provider may request additional information or to have a follow-up visit with you prior to refilling your medication.   *Please Note: Medication refills are assigned a new Rx number when refilled electronically. Your pharmacy may indicate that no refills were authorized even though a new prescription for the same medication is available at the pharmacy. Please request the medicine by name with the pharmacy before contacting your provider for a refill.           [employer] Access Code: Activation code not generated  Current [employer] Status: Active

## 2017-08-15 NOTE — PROGRESS NOTES
"Nicole TurnerPersaud Is a 26 y.o.  status post  delivery on . Patient is here today for incision check. Currently the patient is without complaints.  She reports Normal  BM.  Normal  appetite without nausea and vomiting. She denies fever. Pain is under good control.    /76 mmHg  Ht 1.499 m (4' 11\")  Wt 96.616 kg (213 lb)  BMI 43.00 kg/m2  LMP 10/31/2016  Breastfeeding? Yes  ABD Abdomen soft, non-tender. BS normal. No masses or organomegaly  Incision healing normally, dry and intact      Assessment and plan  Status post  delivery  No complications incision healing well  Followup in 4 weeks for final postpartum checkup    "

## 2017-09-21 ENCOUNTER — POST PARTUM (OUTPATIENT)
Dept: OBGYN | Facility: CLINIC | Age: 27
End: 2017-09-21
Payer: COMMERCIAL

## 2017-09-21 VITALS
DIASTOLIC BLOOD PRESSURE: 70 MMHG | BODY MASS INDEX: 42.54 KG/M2 | SYSTOLIC BLOOD PRESSURE: 102 MMHG | HEIGHT: 59 IN | WEIGHT: 211 LBS

## 2017-09-21 PROCEDURE — 90050 PR POSTPARTUM VISIT: CPT | Performed by: OBSTETRICS & GYNECOLOGY

## 2017-09-21 RX ORDER — NORGESTIMATE AND ETHINYL ESTRADIOL 7DAYSX3 28
1 KIT ORAL DAILY
Qty: 28 TAB | Refills: 12 | Status: SHIPPED | OUTPATIENT
Start: 2017-09-21 | End: 2017-10-19

## 2017-09-21 NOTE — LETTER
September 21, 2017       Patient: Nicole Fishman   YOB: 1990   Date of Visit: 9/21/2017         To Whom It May Concern:    It is my medical opinion that Nicole Fishman return to full duty, no restrictions..    If you have any questions or concerns, please don't hesitate to call [phone]          Sincerely,          Marisela Romero M.D.  Electronically Signed      I have personally seen and examined this patient.  I have fully participated in the care of this patient. I have reviewed all pertinent clinical information, including history, physical exam, plan and the Resident’s note and agree except as noted.

## 2017-09-21 NOTE — PROGRESS NOTES
Nicole Dobson [facility] is a 26 y.o.  s/p  for fetal distress on 17. Patient is here today for her postpartum exam. Currently patient is without complaints at this time. Patient is not breast-feeding. Patient has had menses yet. The patient desires unsure start pills now considering IUD ornexplanon    Well-developed well-nourished female in no apparent distress  Heart regular rate and rhythm  Lungs clear to auscultation bilaterally  Breasts exam and nontender not engorged no dominant masses  Extremities show no clubbing cyanosis or edema    Normal external female genitalia  Well-healed from delivery  Cervix is normal  Uterus approximately 8-10 weeks in size nontender  Adnexa bilaterally normal with no dominant masses    Status post  for fetal distress  Doing well without complaints  BCP Ortho Tri-Cyclen is prescribed  Patient was given information on nexplanon and Mirena  Patient is considered her options may desire placement, if so order and schedule

## 2018-08-30 ENCOUNTER — HOSPITAL ENCOUNTER (OUTPATIENT)
Dept: LAB | Facility: MEDICAL CENTER | Age: 28
End: 2018-08-30
Payer: COMMERCIAL

## 2018-08-30 LAB
BDY FAT % MEASURED: 43.3 %
BP DIAS: 81 MMHG
BP SYS: 120 MMHG
CHOLEST SERPL-MCNC: 154 MG/DL (ref 100–199)
DIABETES HTDIA: NO
EVENT NAME HTEVT: NORMAL
FASTING HTFAS: YES
GLUCOSE SERPL-MCNC: 85 MG/DL (ref 65–99)
HDLC SERPL-MCNC: 49 MG/DL
HYPERTENSION HTHYP: NO
LDLC SERPL CALC-MCNC: 94 MG/DL
SCREENING LOC CITY HTCIT: NORMAL
SCREENING LOC STATE HTSTA: NORMAL
SCREENING LOCATION HTLOC: NORMAL
SMOKING HTSMO: NO
SUBSCRIBER ID HTSID: NORMAL
TRIGL SERPL-MCNC: 55 MG/DL (ref 0–149)

## 2018-08-30 PROCEDURE — S5190 WELLNESS ASSESSMENT BY NONPH: HCPCS

## 2018-08-30 PROCEDURE — 82947 ASSAY GLUCOSE BLOOD QUANT: CPT

## 2018-08-30 PROCEDURE — 36415 COLL VENOUS BLD VENIPUNCTURE: CPT

## 2018-08-30 PROCEDURE — 80061 LIPID PANEL: CPT

## 2018-09-20 ENCOUNTER — IMMUNIZATION (OUTPATIENT)
Dept: OCCUPATIONAL MEDICINE | Facility: CLINIC | Age: 28
End: 2018-09-20

## 2018-09-20 DIAGNOSIS — Z23 NEED FOR VACCINATION: ICD-10-CM

## 2018-09-22 PROCEDURE — 90686 IIV4 VACC NO PRSV 0.5 ML IM: CPT | Performed by: PREVENTIVE MEDICINE

## 2019-01-04 ENCOUNTER — OFFICE VISIT (OUTPATIENT)
Dept: URGENT CARE | Facility: CLINIC | Age: 29
End: 2019-01-04
Payer: COMMERCIAL

## 2019-01-04 VITALS
DIASTOLIC BLOOD PRESSURE: 82 MMHG | HEIGHT: 59 IN | WEIGHT: 211 LBS | OXYGEN SATURATION: 96 % | BODY MASS INDEX: 42.54 KG/M2 | HEART RATE: 98 BPM | TEMPERATURE: 99.3 F | SYSTOLIC BLOOD PRESSURE: 100 MMHG

## 2019-01-04 DIAGNOSIS — R05.9 COUGH: ICD-10-CM

## 2019-01-04 DIAGNOSIS — J22 ACUTE LOWER RESPIRATORY INFECTION: ICD-10-CM

## 2019-01-04 DIAGNOSIS — J98.01 ACUTE BRONCHOSPASM: ICD-10-CM

## 2019-01-04 PROCEDURE — 94640 AIRWAY INHALATION TREATMENT: CPT | Performed by: NURSE PRACTITIONER

## 2019-01-04 PROCEDURE — 99204 OFFICE O/P NEW MOD 45 MIN: CPT | Mod: 25 | Performed by: NURSE PRACTITIONER

## 2019-01-04 RX ORDER — CODEINE PHOSPHATE AND GUAIFENESIN 10; 100 MG/5ML; MG/5ML
5 SOLUTION ORAL
Qty: 120 ML | Refills: 0 | Status: SHIPPED | OUTPATIENT
Start: 2019-01-04 | End: 2019-01-24

## 2019-01-04 RX ORDER — BENZONATATE 100 MG/1
100 CAPSULE ORAL 3 TIMES DAILY PRN
Qty: 30 CAP | Refills: 0 | Status: SHIPPED | OUTPATIENT
Start: 2019-01-04 | End: 2021-01-25

## 2019-01-04 RX ORDER — AZITHROMYCIN 250 MG/1
TABLET, FILM COATED ORAL
Qty: 6 TAB | Refills: 0 | Status: SHIPPED | OUTPATIENT
Start: 2019-01-04 | End: 2021-01-25

## 2019-01-04 RX ORDER — ALBUTEROL SULFATE 90 UG/1
2 AEROSOL, METERED RESPIRATORY (INHALATION) EVERY 6 HOURS PRN
Qty: 8.5 G | Refills: 0 | Status: SHIPPED | OUTPATIENT
Start: 2019-01-04 | End: 2021-01-25

## 2019-01-04 RX ORDER — ALBUTEROL SULFATE 2.5 MG/3ML
2.5 SOLUTION RESPIRATORY (INHALATION) ONCE
Status: COMPLETED | OUTPATIENT
Start: 2019-01-04 | End: 2019-01-04

## 2019-01-04 RX ADMIN — ALBUTEROL SULFATE 2.5 MG: 2.5 SOLUTION RESPIRATORY (INHALATION) at 15:52

## 2019-01-04 NOTE — PROGRESS NOTES
Chief Complaint   Patient presents with   • Cough     sore throat, cough 3xdays, dizzy, shortness of breath, fever,        HISTORY OF PRESENT ILLNESS: Patient is a 28 y.o. female who presents today due to symptoms that started four days ago. Pt reports a dry cough. Reports associated nasal congestion, mild sore throat, bilateral ear pressure, fever, chills, fatigue, malaise, shortness of breath, and body aches. Notes to chest tightness with cough. Denies chest pain or wheezing. Denies h/o asthma/copd/CAP. No immunocompromise. Has tried OTC cold medications without significant relief of symptoms. No recent ABX use. No other aggravating or alleviating factors.     Patient Active Problem List    Diagnosis Date Noted   • Labor and delivery, indication for care 08/04/2017   • Group B Streptococcus carrier, +RV culture, currently pregnant 07/10/2017   • Chlamydia infection- s/p tx- negative SHELDON 03/01/2017   • Encounter for supervision of normal first pregnancy in first trimester 02/03/2017   • Increased BMI - 40.6 02/03/2017       Allergies:Patient has no known allergies.    Current Outpatient Prescriptions Ordered in [facility]   Medication Sig Dispense Refill   • ibuprofen (MOTRIN) 600 MG Tab Take 1 Tab by mouth every 6 hours as needed for Moderate Pain (cramping). 30 Tab 1   • oxycodone-acetaminophen (PERCOCET) 5-325 MG Tab Take 1 Tab by mouth every four hours as needed for Moderate Pain or Severe Pain. (Patient not taking: Reported on 1/4/2019) 30 Tab 0   • Prenat w/o A FeCbnFeGlu-FA &B6 (CITRANATAL B-CALM) 20-1 & 25 (2) MG Misc Take 3 tablet by mouth every day. (Patient not taking: Reported on 1/4/2019) 30 Each 4   • Misc. Devices (BREAST PUMP) Misc 1 Each by Other route every day. Double Electric Pump, Medically Necessary   Z39.1 (Patient not taking: Reported on 1/4/2019) 1 Each 0     No current [facility]-ordered facility-administered medications on file.        Past Medical History:   Diagnosis Date   • H/O bladder infections  "2016   • Migraine 2016   • Pregnant        Social History   Substance Use Topics   • Smoking status: Former Smoker   • Smokeless tobacco: Never Used      Comment: occasionally   • Alcohol use Yes      Comment: occ       Family Status   Relation Status   • Mo (Not Specified)   • Fa (Not Specified)   • MGMo (Not Specified)     Family History   Problem Relation Age of Onset   • Hypertension Mother    • Diabetes Mother    • Other Mother         gout   • Hypertension Father    • Diabetes Father    • Other Father         gout   • Heart Disease Maternal Grandmother        ROS:  Review of Systems   Constitutional: Positive for subjective fever, chills, fatigue, malaise. Negative for weight loss.  HENT: Positive for congestion, ear pressure, and sore throat. Negative for ear pain, nosebleeds, and neck pain.    Eyes: Negative for vision changes.   Cardiovascular: Negative for chest pain, palpitations, orthopnea and leg swelling.   Respiratory: Positive for cough and sputum production, shortness of breath and wheezing.   Gastrointestinal: Negative for abdominal pain, nausea, vomiting or diarrhea.   Skin: Negative for rash, diaphoresis.     Exam:  Blood pressure 100/82, pulse 98, temperature 37.4 °C (99.3 °F), height 1.499 m (4' 11\"), weight 95.7 kg (211 lb), SpO2 96 %, not currently breastfeeding.  General: well-nourished, well-developed female in NAD  Head: normocephalic, atraumatic  Eyes: PERRLA, EOM within normal limits, no conjunctival injection, no scleral icterus, visual fields and acuity grossly intact.  Ears: normal shape and symmetry, no tenderness, no discharge. External canals are without any significant edema or erythema. Tympanic membranes are without any inflammation, no effusion. Gross auditory acuity is intact.  Nose: symmetrical without tenderness, mild discharge, erythema present bilateral nares.  Mouth/Throat: reasonable hygiene, no exudates or tonsillar enlargement. Mild erythema present.   Neck: no masses, " range of motion within normal limits, no tracheal deviation.  Lymph: mild cervical adenopathy. No supraclavicular adenopathy.   Neuro: alert and oriented. Cranial nerves 1-12 grossly intact.   Cardiovascular: regular rate and rhythm without murmurs, rubs, or gallops. No edema.   Pulmonary: no distress. Chest is symmetrical with respiration. No wheezes, crackles, or rhonchi.   Musculoskeletal: appropriate muscle tone, gait is stable.  Skin: warm, dry, intact, no clubbing, no cyanosis.   Psych: appropriate mood, affect, judgement.         Assessment/Plan:  1. Acute lower respiratory infection  azithromycin (ZITHROMAX) 250 MG Tab   2. Acute bronchospasm  albuterol (PROVENTIL) 2.5mg/3ml nebulizer solution 2.5 mg    albuterol 108 (90 Base) MCG/ACT Aero Soln inhalation aerosol   3. Cough  benzonatate (TESSALON) 100 MG Cap    guaifenesin-codeine (ROBITUSSIN AC) Solution oral solution           Patient is given albuterol neb in clinic, improvement with treatment. Discussed symptoms most likely viral, self limiting illness. Discussed natural progression and sx care. Albuterol as needed. Tessalon and Robitussin AC for cough, sedative effects of medication discussed with patient. Contingent antibiotic prescription given to patient to fill upon meeting criteria of guidelines discussed.   Rest, increase fluids, hand and respiratory hygiene.   Supportive care, differential diagnoses, and indications for immediate follow-up discussed with patient.   Pathogenesis of diagnosis discussed including typical length and natural progression.  Instructed to return to clinic or nearest emergency department for any change in condition, further concerns, or worsening of symptoms.  Patient states understanding of the plan of care and discharge instructions.  Instructed to make an appointment with their primary care provider in the next 3-7 days if not significantly improving and for further care.         Please note that this dictation was  created using voice recognition software. I have made every reasonable attempt to correct obvious errors, but I expect that there are errors of grammar and possibly content that I did not discover before finalizing the note.      MATIAS Lopes.

## 2019-01-04 NOTE — LETTER
January 4, 2019         Patient: Nicole Fishman   YOB: 1990   Date of Visit: 1/4/2019           To Whom it May Concern:    Nicole Fishman was seen in my clinic on 1/4/2019. She may be excused from work for three days due to illness.     If you have any questions or concerns, please don't hesitate to call.        Sincerely,           MATIAS Lopes.  Electronically Signed

## 2019-01-07 ENCOUNTER — OFFICE VISIT (OUTPATIENT)
Dept: MEDICAL GROUP | Age: 29
End: 2019-01-07
Payer: COMMERCIAL

## 2019-01-07 VITALS
TEMPERATURE: 97.4 F | HEIGHT: 59 IN | DIASTOLIC BLOOD PRESSURE: 70 MMHG | HEART RATE: 90 BPM | OXYGEN SATURATION: 98 % | BODY MASS INDEX: 41.49 KG/M2 | SYSTOLIC BLOOD PRESSURE: 118 MMHG | WEIGHT: 205.8 LBS

## 2019-01-07 DIAGNOSIS — E66.01 MORBID OBESITY WITH BMI OF 40.0-44.9, ADULT (HCC): ICD-10-CM

## 2019-01-07 DIAGNOSIS — Z00.00 ROUTINE GENERAL MEDICAL EXAMINATION AT HEALTH CARE FACILITY: ICD-10-CM

## 2019-01-07 PROBLEM — O99.820 GROUP B STREPTOCOCCUS CARRIER, +RV CULTURE, CURRENTLY PREGNANT: Status: RESOLVED | Noted: 2017-07-10 | Resolved: 2019-01-07

## 2019-01-07 PROBLEM — Z34.01 ENCOUNTER FOR SUPERVISION OF NORMAL FIRST PREGNANCY IN FIRST TRIMESTER: Status: RESOLVED | Noted: 2017-02-03 | Resolved: 2019-01-07

## 2019-01-07 PROBLEM — A74.9 CHLAMYDIA INFECTION: Status: RESOLVED | Noted: 2017-03-01 | Resolved: 2019-01-07

## 2019-01-07 PROCEDURE — 99385 PREV VISIT NEW AGE 18-39: CPT | Performed by: INTERNAL MEDICINE

## 2019-01-07 ASSESSMENT — PATIENT HEALTH QUESTIONNAIRE - PHQ9
SUM OF ALL RESPONSES TO PHQ QUESTIONS 1-9: 9
CLINICAL INTERPRETATION OF PHQ2 SCORE: 2
5. POOR APPETITE OR OVEREATING: 1 - SEVERAL DAYS

## 2019-01-07 NOTE — ASSESSMENT & PLAN NOTE
Patient presented to clinic for establish care and routine physical exam.  Patient stated that she had cough, fever, shortness of breath last week and was seen in urgent care.  She stated that her symptom resolved with Z-Dylan, albuterol inhaler and cough medications.  She does not have any shortness of breath or cough or fever anymore.  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  She does not have regular physical exercise and does not eat healthy diet.  She feels tired easily.  She has 17-month-old baby boy and she stopped breast-feeding already.  She has regular menstrual cycle once a month and last for 3-4 days.  She states that her menstrual flow is heavy in the first 2 days.

## 2019-01-07 NOTE — ASSESSMENT & PLAN NOTE
Patient stated that she has not exercised regularly and does not eat healthy diet.  She agreed to start regular physical exercise and change her diet.  She has 17 months old baby boy at home and she works at night shift.  Her parents help her taking care of her baby.  She felt tired and low mood lately especially when she has cold symptoms last week.  She denied history of depression.  She did not want to take any medication for depression.  We discussed regular physical exercise and healthy diet plan.  We also discussed how to cope her stress and arrange to have daily exercise and activities with family to improve her mood.  She agreed with the plan.

## 2019-01-07 NOTE — PROGRESS NOTES
Chief Complaint:     Nicole Dobson [facility] is a 28 y.o. female who presents for annual exam and establish care.    Pt has GYN provider: yes  Last colonoscopy: Not indicated yet  Bone density test:N\A  Gardasil:N\A   Last Td: 7/25/17  Regular exercise: no     Routine general medical examination at health care facility  Patient presented to clinic for establish care and routine physical exam.  Patient stated that she had cough, fever, shortness of breath last week and was seen in urgent care.  She stated that her symptom resolved with Z-Dylan, albuterol inhaler and cough medications.  She does not have any shortness of breath or cough or fever anymore.  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  She does not have regular physical exercise and does not eat healthy diet.  She feels tired easily.  She has 17-month-old baby boy and she stopped breast-feeding already.  She has regular menstrual cycle once a month and last for 3-4 days.  She states that her menstrual flow is heavy in the first 2 days.    Morbid obesity with BMI of 40.0-44.9, adult (HCC)  Patient stated that she has not exercised regularly and does not eat healthy diet.  She agreed to start regular physical exercise and change her diet.  She has 17 months old baby boy at home and she works at night shift.  Her parents help her taking care of her baby.  She felt tired and low mood lately especially when she has cold symptoms last week.  She denied history of depression.  She did not want to take any medication for depression.  We discussed regular physical exercise and healthy diet plan.  We also discussed how to cope her stress and arrange to have daily exercise and activities with family to improve her mood.  She agreed with the plan.        She  has a past medical history of H/O bladder infections (2016); Migraine (2016); and Pregnant. She also has no past medical history of  Addisons disease (HCC); Allergy; Anemia; Anxiety; Arrhythmia; Arthritis; Asthma; Blood transfusion without reported diagnosis; Cancer (HCC); Cataract; CHF (congestive heart failure) (HCC); Clotting disorder (HCC); COPD (chronic obstructive pulmonary disease) (HCC); Cushings syndrome (HCC); Depression; Diabetes (HCC); Diabetic neuropathy (HCC); GERD (gastroesophageal reflux disease); Glaucoma; Goiter; Heart attack (HCC); Heart murmur; HIV (human immunodeficiency virus infection) (HCC); Hyperlipidemia; Hypertension; IBD (inflammatory bowel disease); Kidney disease; Meningitis; Muscle disorder; Osteoporosis; Parathyroid disorder (HCC); Pituitary disease (HCC); Pulmonary emphysema (HCC); Seizure (HCC); Sickle cell disease (HCC); Stroke (HCC); Substance abuse ([facility]); Tuberculosis; or Ulcer.  She  has a past surgical history that includes primary c section (8/5/2017).  Current Outpatient Prescriptions   Medication Sig Dispense Refill   • albuterol 108 (90 Base) MCG/ACT Aero Soln inhalation aerosol Inhale 2 Puffs by mouth every 6 hours as needed for Shortness of Breath. 8.5 g 0   • benzonatate (TESSALON) 100 MG Cap Take 1 Cap by mouth 3 times a day as needed. 30 Cap 0   • guaifenesin-codeine (ROBITUSSIN AC) Solution oral solution Take 5 mL by mouth at bedtime as needed for up to 20 days. Do not drive, work, drink alcohol or engaged in potentially hazardous activity while taking this medication. 120 mL 0   • azithromycin (ZITHROMAX) 250 MG Tab Take two tabs on day one followed by one tab on days 2-5. 6 Tab 0     No current facility-administered medications for this visit.      Social History   Substance Use Topics   • Smoking status: Former Smoker   • Smokeless tobacco: Never Used      Comment: occasionally   • Alcohol use Yes      Comment: occ       Review Of Systems  Denies fever, chills, or sweats, unexplained weight changes  Skin: negative for rash, changing moles, abnormal pigmentation, hair or nail changes.  Eyes:  "negative for visual blurring, double vision, eye pain, floaters and discharge from eyes  Ears/Nose/Throat: negative for tinnitus, vertigo, oral or dental problem, hoarseness, frequent URI's, sinus trouble, persistent sore throat  Respiratory: negative for persistent cough, hemoptysis, dyspnea, wheezing  Cardiovascular: negative for palpitations, tachycardia, irregular heart beat, chest pain or pressure or peripheral edema.  Breast: Denies breast tenderness, mass,  changes in size or contour, or abnormal cyclic discomfort.  Gastrointestinal: negative for dysphagia or odynophagia, nausea, heartburn or reflux, abdominal pain, hemorrhoids, constipation or diarrhea, black stool or bloody stool  Genitourinary: negative for nocturia, dysuria, frequency, incontinence, abnormal vaginal discharge, dysparunia or abnormal vaginal bleeding  Musculoskeletal: negative for joint swelling and muscle pain/ soreness  Neurologic: negative for new or changing headaches, new weakness tremor  Psychiatric: negative for mood or sleep disturbance, anxiety, depression, sexual difficulties  Hematologic/Lymphatic/Immunologic: negative for pallor, unusual bruising, swollen glands, HIV risk factors  Endocrine: negative for temperature intolerance, polydipsia, polyuria.       PHYSICAL EXAMINATION:  Blood pressure 118/70, pulse 90, temperature 36.3 °C (97.4 °F), temperature source Temporal, height 1.499 m (4' 11\"), weight 93.4 kg (205 lb 12.8 oz), SpO2 98 %, not currently breastfeeding.  Body mass index is 41.57 kg/m².  Wt Readings from Last 4 Encounters:   01/07/19 93.4 kg (205 lb 12.8 oz)   01/04/19 95.7 kg (211 lb)   09/21/17 95.7 kg (211 lb)   08/15/17 96.6 kg (213 lb)       Constitutional: Alert, no distress.  Skin: Warm, dry, good turgor, no rashes or suspicious lesions in visible areas.  Eye: pupils equal, round and reactive to light, conjunctivae clear, lids normal.  ENMT: Lips without lesions, good dentition, oropharynx clear. Pinnae skin " normal with no lesions. TM pearly gray with normal light reflex.   Neck: supple, no masses. No anterior or supraclavicular adenopathy. No carotid bruits no thyromegaly.  Respiratory: Unlabored respiratory effort, lungs clear to auscultation, no wheezes, no ronchi.  Cardiovascular: Normal S1, S2, no murmur, no peripheral edema.  Abdomen: no CVAT abdomen Soft, non-tender, no masses, no hepatosplenomegaly.  Psych: Alert and oriented x3, normal affect and mood.  Musc/Skel: 5/5 strength and normal motion UE and LE proximal and distal.        ASSESSMENT/PLAN:  1. Routine general medical examination at health care facility  CBC WITH DIFFERENTIAL    COMP METABOLIC PANEL    Counseling for healthy diet and regular physical exercise.  Patient is advised to take multivitamins with iron once a day.  We also discussed age appropriate screening.  She has normal Pap smear with gynecologist on 2/3/17.  We discussed to have more physical activity and enough rest for her tiredness and low mood.  Patient denied history of major depression and denied suicidal ideation or plan.  She did not want to take any medication for depression.  Patient agreed to manage her low mood with nonpharmacological treatment.     2. Morbid obesity with BMI of 40.0-44.9, adult (HCC)  Patient identified as having weight management issue.  Appropriate orders and counseling given.    I discussed with patient to avoid eating high carbohydrate diet and start doing physical cardio exercise 4 days a week, 30 minutes to 45 minutes each time.       HCM: Patient stated that she has chickenpox in her childhood and she did not require to have chickenpox vaccine.  She received her flu vaccine on 9/22/18.  Labs ordered  Immunizations per orders  Pt counseled about skin care, diet, supplements, and exercise.  Next office visit for recheck of chronic medical conditions is due in 1 year.

## 2019-03-18 ENCOUNTER — OFFICE VISIT (OUTPATIENT)
Dept: URGENT CARE | Facility: CLINIC | Age: 29
End: 2019-03-18
Payer: COMMERCIAL

## 2019-03-18 VITALS
BODY MASS INDEX: 42.54 KG/M2 | DIASTOLIC BLOOD PRESSURE: 86 MMHG | TEMPERATURE: 97.8 F | HEART RATE: 80 BPM | SYSTOLIC BLOOD PRESSURE: 122 MMHG | WEIGHT: 211 LBS | RESPIRATION RATE: 100 BRPM | OXYGEN SATURATION: 16 % | HEIGHT: 59 IN

## 2019-03-18 DIAGNOSIS — H10.30 ACUTE BACTERIAL CONJUNCTIVITIS, UNSPECIFIED LATERALITY: ICD-10-CM

## 2019-03-18 RX ORDER — MOXIFLOXACIN 5 MG/ML
1 SOLUTION/ DROPS OPHTHALMIC 3 TIMES DAILY
Qty: 2 ML | Refills: 0 | Status: SHIPPED | OUTPATIENT
Start: 2019-03-18 | End: 2019-03-25

## 2019-03-18 NOTE — LETTER
March 18, 2019         Patient: Nicole Fishman   YOB: 1990   Date of Visit: 3/18/2019           To Whom it May Concern:    Nicole Fishman was seen in my clinic on 3/18/2019. Please excuse patient from work due to illness today. She may return to work on 3/19 or 3/20/19.    If you have any questions or concerns, please don't hesitate to call.        Sincerely,           Belkys Noel D.O.  Electronically Signed

## 2019-03-18 NOTE — PROGRESS NOTES
HPI: Nicole Fishman is a 28 y.o. female who presents with   Chief Complaint   Patient presents with   • Eye Problem     left eye-x1 day- red,pressure,feels heavy        Worsened by: activity, laying supine at night, first thing in the morning, when exposed to outside allergens  Improved by: OTC symptomatic medictions    Review of Systems performed. All other systems are negative except for what is listed above.     PMH:  has a past medical history of H/O bladder infections (2016); Migraine (2016); and Pregnant. She also has no past medical history of Addisons disease ([facility]); Allergy; Anemia; Anxiety; Arrhythmia; Arthritis; Asthma; Blood transfusion without reported diagnosis; Cancer ([facility]); Cataract; CHF (congestive heart failure) ([facility]); Clotting disorder ([facility]); COPD (chronic obstructive pulmonary disease) ([facility]); Cushings syndrome ([facility]); Depression; Diabetes ([facility]); Diabetic neuropathy ([facility]); GERD (gastroesophageal reflux disease); Glaucoma; Goiter; Heart attack ([facility]); Heart murmur; HIV (human immunodeficiency virus infection) ([facility]); Hyperlipidemia; Hypertension; IBD (inflammatory bowel disease); Kidney disease; Meningitis; Muscle disorder; Osteoporosis; Parathyroid disorder ([facility]); Pituitary disease ([facility]); Pulmonary emphysema ([facility]); Seizure ([facility]); Sickle cell disease ([facility]); Stroke ([facility]); Substance abuse ([facility]); Tuberculosis; or Ulcer.  MEDS:   Current Outpatient Prescriptions:   •  albuterol 108 (90 Base) MCG/ACT Aero Soln inhalation aerosol, Inhale 2 Puffs by mouth every 6 hours as needed for Shortness of Breath., Disp: 8.5 g, Rfl: 0  •  benzonatate (TESSALON) 100 MG Cap, Take 1 Cap by mouth 3 times a day as needed. (Patient not taking: Reported on 3/18/2019), Disp: 30 Cap, Rfl: 0  •  azithromycin (ZITHROMAX) 250 MG Tab, Take two tabs on day one followed by one tab on days 2-5. (Patient not taking: Reported on 3/18/2019), Disp: 6 Tab, Rfl: 0  ALLERGIES: No Known Allergies  SURGHX:   Past Surgical  "History:   Procedure Laterality Date   • PRIMARY C SECTION  8/5/2017    Procedure: PRIMARY C SECTION;  Surgeon: Marisela Romero M.D.;  Location: LABOR AND DELIVERY;  Service:      SOCHX:  reports that she has quit smoking. She has never used smokeless tobacco. She reports that she drinks alcohol. She reports that she does not use drugs.  FH: Family history was reviewed, no pertinent findings to report    PE:  Vitals /86 (BP Location: Right arm, Patient Position: Sitting, BP Cuff Size: Large adult)   Pulse 80   Temp 36.6 °C (97.8 °F) (Temporal)   Resp (!) 100   Ht 1.499 m (4' 11\")   Wt 95.7 kg (211 lb)   SpO2 (!) 16%   BMI 42.62 kg/m²    Gen AOx4, NAD  HEENT: moist mucus membranes, no pain or pressure with percussion of frontal, maxillary or ethmoid sinuses.  Bilateral conjunciva clear without erythema or exudate,  Bilateral TM's clear without bulge, fluid or loss of landmarks, no pharyngeal erythema or tonsillar exudate or tonsillar enlargement  Neck: supple, no cervical lymphadenopathy, no signs of menigismus  CV/PULM: RRR no murmurs, no rales ronchi or wheezes, no signs of resp distress  Abd soft nontender, bs present  Skin no rashes  Extremities -c/c/e  Neuro appropriate affect,     A/P  1. Acute bacterial conjunctivitis, unspecified laterality  moxifloxacin (VIGAMOX) 0.5 % Solution       "

## 2019-11-21 NOTE — CARE PLAN
Problem: Altered physiologic condition related to postoperative  delivery  Goal: Patient physiologically stable as evidenced by normal lochia, palpable uterine involution and vital signs within normal limits  Outcome: PROGRESSING AS EXPECTED  Fundus firm, lochia scant, vitals stable.  incision open to air with staples intact, no drainage noted.     Problem: Alteration in comfort related to surgical incision and/or after birth pains  Goal: Patient is able to ambulate, care for self and infant with acceptable pain level  Outcome: PROGRESSING AS EXPECTED  Pt states pain is being well controlled, calls for PRN pain medication as needed. Provided education about splinting incision.          RESTRAINTS REMAIN OFF AT THIS TIME. NOT PULLING AT MEDICAL EQUIPMENT OR LINES.
WILL CONTINUE TO CLOSELY MONITOR.

## 2021-01-25 ENCOUNTER — TELEMEDICINE (OUTPATIENT)
Dept: MEDICAL GROUP | Facility: MEDICAL CENTER | Age: 31
End: 2021-01-25
Payer: COMMERCIAL

## 2021-01-25 VITALS — WEIGHT: 213 LBS | BODY MASS INDEX: 42.94 KG/M2 | HEIGHT: 59 IN

## 2021-01-25 DIAGNOSIS — R63.5 WEIGHT GAIN: ICD-10-CM

## 2021-01-25 DIAGNOSIS — Z00.00 ANNUAL PHYSICAL EXAM: ICD-10-CM

## 2021-01-25 DIAGNOSIS — Z83.3 FAMILY HISTORY OF DIABETES MELLITUS: ICD-10-CM

## 2021-01-25 DIAGNOSIS — R06.02 SHORTNESS OF BREATH ON EXERTION: ICD-10-CM

## 2021-01-25 DIAGNOSIS — E66.01 MORBID OBESITY WITH BMI OF 40.0-44.9, ADULT (HCC): ICD-10-CM

## 2021-01-25 PROCEDURE — 99214 OFFICE O/P EST MOD 30 MIN: CPT | Performed by: NURSE PRACTITIONER

## 2021-01-25 SDOH — HEALTH STABILITY: MENTAL HEALTH: HOW MANY STANDARD DRINKS CONTAINING ALCOHOL DO YOU HAVE ON A TYPICAL DAY?: 1 OR 2

## 2021-01-25 SDOH — HEALTH STABILITY: MENTAL HEALTH: HOW OFTEN DO YOU HAVE A DRINK CONTAINING ALCOHOL?: 2-4 TIMES A MONTH

## 2021-01-25 ASSESSMENT — PATIENT HEALTH QUESTIONNAIRE - PHQ9: CLINICAL INTERPRETATION OF PHQ2 SCORE: 0

## 2021-01-25 NOTE — ASSESSMENT & PLAN NOTE
Patient states she has been gaining a significant amount of weight. She would like to get labs done. Has gained about 40 pounds since she became pregnant and had her baby in 2017.     Not as active this year due to COVID, does try to exercise at home occasionally. Exercise is walking twice weekly, not consistent, for about 1 hour. Not a fast walk, more of a stroll.     Working 12 hour shifts, not eating breakfast, coffee with creamer in the morning. Oatmeal or sandwich for break. Lunch which consists of a lot of rice. Was drinking a lot of soda, has stopped this for about 1 month. Dinner meat with rice or pasta.

## 2021-01-25 NOTE — ASSESSMENT & PLAN NOTE
Started after pregnancy. Worse with quick walk, running, walking up stairs. Goes away quickly. Does feel that she gets some wheezing if she exerts herself too much. No history of asthma for her.

## 2021-01-25 NOTE — PROGRESS NOTES
Telemedicine Visit: Established Patient     This encounter was conducted via Zoom.   Verbal consent was obtained. Patient's identity was verified.    Subjective:   CC: establish care and discuss weight gain  Nicole Crossaaron-Persaud is a 30 y.o. female presenting for evaluation and management of:    Weight gain  Patient states she has been gaining a significant amount of weight. She would like to get labs done. Has gained about 40 pounds since she became pregnant and had her baby in 2017.     Not as active this year due to COVID, does try to exercise at home occasionally. Exercise is walking twice weekly, not consistent, for about 1 hour. Not a fast walk, more of a stroll.     Working 12 hour shifts, not eating breakfast, coffee with creamer in the morning. Oatmeal or sandwich for break. Lunch which consists of a lot of rice. Was drinking a lot of soda, has stopped this for about 1 month. Dinner meat with rice or pasta.     Shortness of breath on exertion  Started after pregnancy. Worse with quick walk, running, walking up stairs. Goes away quickly. Does feel that she gets some wheezing if she exerts herself too much. No history of asthma for her.        ROS   Denies any recent fevers or chills. No nausea or vomiting. No chest pains or shortness of breath.     No Known Allergies    Current medicines (including changes today)  No current outpatient medications on file.     No current facility-administered medications for this visit.        Patient Active Problem List    Diagnosis Date Noted   • Weight gain 01/25/2021   • Shortness of breath on exertion 01/25/2021   • Routine general medical examination at health care facility 01/07/2019   • Morbid obesity with BMI of 40.0-44.9, adult (Formerly [facility]) 02/03/2017       Family History   Problem Relation Age of Onset   • Hypertension Mother    • Diabetes Mother    • Other Mother         gout   • Thyroid Mother    • Hypertension Father    • Diabetes Father    • Other Father   "       gout   • Heart Disease Maternal Grandmother        She  has a past medical history of H/O bladder infections (2016) and Migraine (2016).  She  has a past surgical history that includes primary c section (8/5/2017).       Objective:   Ht 1.499 m (4' 11\")   Wt 96.6 kg (213 lb)   LMP 01/24/2021 (Exact Date)   BMI 43.02 kg/m²     Physical Exam:  Constitutional: Alert, no distress, well-groomed.  Skin: No rashes in visible areas.  Eye: Round. Conjunctiva clear, lids normal. No icterus.   ENMT: Lips pink without lesions, good dentition, moist mucous membranes. Phonation normal.  Neck: No masses, no thyromegaly. Moves freely without pain.  CV: Pulse as reported by patient  Respiratory: Unlabored respiratory effort, no cough or audible wheeze  Psych: Alert and oriented x3, normal affect and mood.       Assessment and Plan:   The following treatment plan was discussed:     1. Weight gain  Unstable  Likely due to poor diet and lack of exercise  Advised diet and exercise  Check labs  - HEMOGLOBIN A1C; Future  - TSH WITH REFLEX TO FT4; Future    2. Shortness of breath on exertion  Stable  Likely due to deconditioning    3. Annual physical exam  - CBC WITH DIFFERENTIAL; Future  - Comp Metabolic Panel; Future  - HEMOGLOBIN A1C; Future  - Lipid Profile; Future  - TSH WITH REFLEX TO FT4; Future  - VITAMIN D,25 HYDROXY; Future    4. Family history of diabetes mellitus  - HEMOGLOBIN A1C; Future        Follow-up: Return in about 4 weeks (around 2/22/2021) for Lab Review.            "

## 2021-02-16 ENCOUNTER — TELEPHONE (OUTPATIENT)
Dept: MEDICAL GROUP | Facility: MEDICAL CENTER | Age: 31
End: 2021-02-16

## 2021-02-16 NOTE — TELEPHONE ENCOUNTER
ESTABLISHED PATIENT PRE-VISIT PLANNING     Patient was contacted to complete PVP.     Note: Patient will not be contacted if there is no indication to call.     1.  Reviewed notes from the last few office visits within the medical group: Yes    2.  If any orders were placed at last visit or intended to be done for this visit (i.e. 6 mos follow-up), do we have Results/Consult Notes?         •  Labs - Labs ordered, NOT completed. Patient advised to complete prior to next appointment.  Note: If patient appointment is for lab review and patient did not complete labs, check with provider if OK to reschedule patient until labs completed.       •  Imaging - Imaging was not ordered at last office visit.       •  Referrals - No referrals were ordered at last office visit.    3. Is this appointment scheduled as a Hospital Follow-Up? No    4.  Immunizations were updated in Epic using Reconcile Outside Information activity? Yes    5.  Patient is due for the following Health Maintenance Topics:   Health Maintenance Due   Topic Date Due   • PAP SMEAR  02/03/2020   • IMM INFLUENZA (1) 09/01/2020       6.  AHA (Pulse8) form printed for Provider? N/A         Outside information NOT reconciled using the [employer] feature. Per Kierra Ponce, the [employer] feature is down as of 02/09/2021 at 2:00pm. Will reconcile outside information at a later date.

## 2021-02-22 ENCOUNTER — APPOINTMENT (OUTPATIENT)
Dept: MEDICAL GROUP | Facility: MEDICAL CENTER | Age: 31
End: 2021-02-22
Payer: COMMERCIAL

## 2021-02-22 ENCOUNTER — TELEPHONE (OUTPATIENT)
Dept: MEDICAL GROUP | Facility: MEDICAL CENTER | Age: 31
End: 2021-02-22

## 2021-10-28 ENCOUNTER — APPOINTMENT (OUTPATIENT)
Dept: MEDICAL GROUP | Facility: MEDICAL CENTER | Age: 31
End: 2021-10-28
Payer: COMMERCIAL

## 2022-06-04 ENCOUNTER — OFFICE VISIT (OUTPATIENT)
Dept: URGENT CARE | Facility: PHYSICIAN GROUP | Age: 32
End: 2022-06-04
Payer: COMMERCIAL

## 2022-06-04 VITALS
TEMPERATURE: 98.4 F | SYSTOLIC BLOOD PRESSURE: 100 MMHG | DIASTOLIC BLOOD PRESSURE: 88 MMHG | HEIGHT: 58 IN | RESPIRATION RATE: 20 BRPM | BODY MASS INDEX: 44.92 KG/M2 | OXYGEN SATURATION: 97 % | WEIGHT: 214 LBS | HEART RATE: 87 BPM

## 2022-06-04 DIAGNOSIS — J06.9 VIRAL URI: ICD-10-CM

## 2022-06-04 DIAGNOSIS — M79.10 MYALGIA: ICD-10-CM

## 2022-06-04 PROCEDURE — 99213 OFFICE O/P EST LOW 20 MIN: CPT | Performed by: FAMILY MEDICINE

## 2022-06-04 RX ORDER — DICLOFENAC SODIUM 75 MG/1
75 TABLET, DELAYED RELEASE ORAL 2 TIMES DAILY
Qty: 30 TABLET | Refills: 0 | Status: SHIPPED | OUTPATIENT
Start: 2022-06-04 | End: 2023-04-17

## 2022-06-04 RX ORDER — DEXTROMETHORPHAN HYDROBROMIDE AND PROMETHAZINE HYDROCHLORIDE 15; 6.25 MG/5ML; MG/5ML
5 SYRUP ORAL EVERY EVENING
Qty: 118 ML | Refills: 0 | Status: SHIPPED | OUTPATIENT
Start: 2022-06-04 | End: 2023-04-17

## 2022-06-04 ASSESSMENT — ENCOUNTER SYMPTOMS
COUGH: 1
VOMITING: 0
MYALGIAS: 1
FEVER: 0

## 2022-06-04 NOTE — PROGRESS NOTES
"Subjective:     Nicole Fishman is a 31 y.o. female who presents for Cough (Body aches, dry cough, chills runny nose. Onset 6 days.)    HPI  Pt presents for evaluation of an acute problem  Patient with acute illness for the past 6 days  Having cough, myalgia, rhinorrhea  No fevers  No sick contacts at home, however works around many sick people  No vomiting or diarrhea  Had a negative COVID test yesterday     Review of Systems   Constitutional: Positive for malaise/fatigue. Negative for fever.   Respiratory: Positive for cough.    Gastrointestinal: Negative for vomiting.   Musculoskeletal: Positive for myalgias.   Skin: Negative for rash.       PMH:  has a past medical history of H/O bladder infections (2016) and Migraine (2016).  MEDS: No current outpatient medications on file.  ALLERGIES: No Known Allergies  SURGHX:   Past Surgical History:   Procedure Laterality Date   • PRIMARY C SECTION  8/5/2017    Procedure: PRIMARY C SECTION;  Surgeon: Marisela Romero M.D.;  Location: LABOR AND DELIVERY;  Service:      SOCHX:  reports that she quit smoking about 19 years ago. She has never used smokeless tobacco. She reports previous alcohol use. She reports that she does not use drugs.     Objective:   /88 (BP Location: Left arm, Patient Position: Sitting, BP Cuff Size: Large adult)   Pulse 87   Temp 36.9 °C (98.4 °F) (Temporal)   Resp 20   Ht 1.473 m (4' 10\")   Wt 97.1 kg (214 lb)   SpO2 97%   BMI 44.73 kg/m²     Physical Exam  Constitutional:       General: She is not in acute distress.     Appearance: She is well-developed. She is not diaphoretic.   HENT:      Head: Normocephalic and atraumatic.      Right Ear: Tympanic membrane, ear canal and external ear normal.      Left Ear: Tympanic membrane, ear canal and external ear normal.      Nose: Nose normal.      Mouth/Throat:      Mouth: Mucous membranes are moist.      Pharynx: Oropharynx is clear. No oropharyngeal exudate or posterior " oropharyngeal erythema.   Neck:      Trachea: No tracheal deviation.   Cardiovascular:      Rate and Rhythm: Normal rate and regular rhythm.   Pulmonary:      Effort: Pulmonary effort is normal. No respiratory distress.      Breath sounds: Normal breath sounds. No wheezing or rales.   Musculoskeletal:      Cervical back: Normal range of motion and neck supple. No tenderness.   Lymphadenopathy:      Cervical: No cervical adenopathy.   Skin:     General: Skin is warm and dry.      Findings: No rash.   Neurological:      Mental Status: She is alert.         Assessment/Plan:   Assessment    1. Viral URI  - promethazine-dextromethorphan (PROMETHAZINE-DM) 6.25-15 MG/5ML syrup; Take 5 mL by mouth every evening.  Dispense: 118 mL; Refill: 0  - diclofenac DR (VOLTAREN) 75 MG Tablet Delayed Response; Take 1 Tablet by mouth 2 times a day.  Dispense: 30 Tablet; Refill: 0    2. Myalgia  - promethazine-dextromethorphan (PROMETHAZINE-DM) 6.25-15 MG/5ML syrup; Take 5 mL by mouth every evening.  Dispense: 118 mL; Refill: 0  - diclofenac DR (VOLTAREN) 75 MG Tablet Delayed Response; Take 1 Tablet by mouth 2 times a day.  Dispense: 30 Tablet; Refill: 0    Patient recovering from a viral illness.  Advised that influenza testing would not  this late in the course of illness.  She has negative COVID testing previously done at her work.  Reviewed supportive care measures and expected course recovery.  No signs of secondary bacterial infection.  Treat with Promethazine DM and diclofenac.  Follow-up as needed.

## 2022-06-04 NOTE — LETTER
June 4, 2022    To Whom It May Concern:         This is confirmation that Nicole Dobson Sravanthi attended her scheduled appointment with Immanuel Phillip M.D. on 6/04/22.  She is recovering from an acute illness.  Please excuse her from days missed from work.  She is anticipated to be well enough to return to work by 6/8/22.  Thank you for making accommodations as she recovers.           If you have any questions please do not hesitate to call me at the phone number listed below.    Sincerely,          Immanuel Phillip M.D.  [phone]

## 2022-11-04 ENCOUNTER — APPOINTMENT (OUTPATIENT)
Dept: MEDICAL GROUP | Facility: MEDICAL CENTER | Age: 32
End: 2022-11-04
Payer: COMMERCIAL

## 2022-11-07 ENCOUNTER — APPOINTMENT (OUTPATIENT)
Dept: MEDICAL GROUP | Facility: MEDICAL CENTER | Age: 32
End: 2022-11-07
Payer: COMMERCIAL

## 2022-11-21 NOTE — PROGRESS NOTES
MEDICARE WELLNESS VISIT + NOTE    CHIEF COMPLAINT:  Nghia Odell presents for her First Annual Medicare Wellness Visit.   Her additional complaints or concerns are addressed below.      Patient Care Team:  Kim Godwin MD as PCP - General        Patient Active Problem List   Diagnosis   • Healthcare maintenance   • Diverticulosis   • Subserous leiomyoma of uterus   • Dense breasts   • Sjogren's syndrome (CMS/HCC)   • Hematuria, microscopic   • High risk medication use   • Abdominal pain, left lower quadrant   • Flatulence   • Plantar fasciitis, left   • Health care maintenance   • Essential hypertension   • Weight loss   • Need for pneumococcal vaccination   • Sciatica of right side   • Discomfort of right ear   • Seasonal allergic rhinitis         Past Medical History:   Diagnosis Date   • Essential (primary) hypertension    • Sciatica of right side 2022   • Sjogren's syndrome (CMS/HCC)          Past Surgical History:   Procedure Laterality Date   •  procedures     • Cholecystectomy     • Cyst removal Left     Armpit   • Cyst removal Right     breast         Social History     Tobacco Use   • Smoking status: Never Smoker   • Smokeless tobacco: Never Used   Vaping Use   • Vaping Use: never used   Substance Use Topics   • Alcohol use: Never   • Drug use: Never     Family Status   Relation Name Status   • Mo  (Not Specified)         Current Outpatient Medications   Medication Sig Dispense Refill   • hydroCHLOROthiazide (HYDRODIURIL) 12.5 MG tablet Take 1 tablet by mouth daily. 30 tablet 11   • hydroxychloroquine (PLAQUENIL) 200 MG tablet Take 1 tablet by mouth daily. 90 tablet 1   • fluticasone (FLONASE) 50 MCG/ACT nasal spray Spray 2 sprays in each nostril daily as needed (allergies). 16 g 12   • desloratadine (CLARINEX) 5 MG tablet Take 1 tablet by mouth daily as needed (allergies). 30 tablet 11     No current facility-administered medications for this visit.        The following  Post Partum Progress Note    Name:   Nicole Fishman   Date/Time:  2017 - 5:35 AM  Chief Admitting Dx:  Pregnancy  GEL  Labor and delivery, indication for care  Fetal intolerence to Labor  Delivery Type:   for fetal distress  Post-Op/Post Partum Days #:  2    Subjective:  Abdominal pain: no  Ambulating:   yes  Tolerating liquids:  yes  Tolerating food:  yes common adult  Flatus:   yes  BM:    no  Bleeding:   without any bleeding  Voiding:   yes  Dizziness:   no  Feeding:   both breast and bottle - Similac with iron    Filed Vitals:    17 0000 17 0400 17 0800 17   BP: 122/69 102/59 105/55 127/74   Pulse: 94 90 90 110   Temp: 36.9 °C (98.4 °F) 36.6 °C (97.9 °F) 36.6 °C (97.8 °F) 36.8 °C (98.3 °F)   Resp: 22 20 16 18   Height:       Weight:       SpO2: 94% 94% 96% 92%       Exam:  Breast: Tenderness no  Abdomen: Abdomen soft, non-tender. BS normal. No masses,  No organomegaly  Fundal Tenderness:  no  Fundus Firm: yes  Incision: dry and intact  Below umbilicus: yes  Perineum: perineum intact  Lochia: mild  Extremities: Normal extremities, peripheral pulses and reflexes normal    Meds:  Current Facility-Administered Medications   Medication Dose   • oxytocin (PITOCIN) infusion (for postpartum)  2,000 mL/hr    Followed by   • oxytocin (PITOCIN) infusion (for postpartum)   mL/hr   • ibuprofen (MOTRIN) tablet 600 mg  600 mg   • oxycodone-acetaminophen (PERCOCET) 5-325 MG per tablet 1 Tab  1 Tab   • oxycodone-acetaminophen (PERCOCET) 5-325 MG per tablet 2 Tab  2 Tab   • morphine (pf) 4 mg/ml injection 4 mg  4 mg   • ondansetron (ZOFRAN) syringe/vial injection 4 mg  4 mg    Or   • ondansetron (ZOFRAN ODT) dispertab 4 mg  4 mg   • diphenhydrAMINE (BENADRYL) tablet/capsule 25 mg  25 mg    Or   • diphenhydrAMINE (BENADRYL) injection 25 mg  25 mg   • oxytocin (PITOCIN) infusion (for postpartum)   mL/hr   • LR infusion     • PRN oxytocin (PITOCIN) (20 Units/1000  items on the Medicare Health Risk Assessment were found to be positive  1.) Do you have an Advance directive, living will, or power of  for health care document that contains your wishes for end of life care?: No     2.) Would you like additional information on advance directives?: Yes     4.) During the past 4 weeks, what was the hardest physical activity you could do for at least 2 minutes?: Light     6 a.) How many servings of Fruits and Vegetables do you have each day ( 1 serving = 1 piece of fruit, 1/2 cup fruits or vegetables): 1 per day     6 b.) How many servings of High Fiber / Whole Grain Foods to you have each day ( 1 serving = 1 cup cold cereal, 1/2 cup cooked cereal, 1 slice bread): 1 per day         Vision and Hearing screens: Both screenings were performed and reviewed    Advance Directive:   The patient has the following documents:  No Advance Directives on file. Patient offered documents.    Cognitive/Functional Status: Mini-Cog performed with score of 5    Opioid Review: Nghia is not taking opioid medications.    Recent PHQ 2/9 Score:    PHQ 2:  Date Adult PHQ 2 Score Adult PHQ 2 Interpretation   11/21/2022 0 No further screening needed       PHQ 9:       DEPRESSION ASSESSMENT/PLAN:  Depression screening is negative no further plan needed.     Body mass index is 27.57 kg/m².    BMI ASSESSMENT/PLAN:  Patient BMI is within normal range.     See Patient Instructions section.   Return in about 1 year (around 11/21/2023) for Medicare Wellness Visit.      OUTPATIENT PROGRESS NOTE    Subjective   Chief Complaint Medicare Wellness Visit and Office Visit    Patient is here for Medicare wellness visit today.  Patient is currently also being followed by Dr. Castle for Sjogren's syndrome.  Patient had a mammogram in November 2021 showed no mammographic evidence of malignancy did show dense breast tissue.  She subsequently did have an ultrasound both breasts which showed stability recommended yearly  mL) PRN for excessive uterine bleeding - See Admin Instr  125-999 mL/hr   • misoprostol (CYTOTEC) tablet 600 mcg  600 mcg   • methylergonovine (METHERGINE) injection 0.2 mg  0.2 mg   • carboPROST (HEMABATE) injection 250 mcg  250 mcg   • docusate sodium (COLACE) capsule 100 mg  100 mg   • bisacodyl (DULCOLAX) suppository 10 mg  10 mg   • prenatal plus vitamin (STUARTNATAL 1+1) 27-1 MG tablet 1 Tab  1 Tab   • simethicone (MYLICON) chewable tab 80 mg  80 mg       Labs:   Recent Labs      17   0015  17   1646   WBC  16.7*  18.1*   RBC  3.99*  3.73*   HEMOGLOBIN  12.1  11.1*   HEMATOCRIT  37.3  33.5*   MCV  93.5  89.8   MCH  30.3  29.8   MCHC  32.4*  33.1*   RDW  46.9  44.9   PLATELETCT  301  291   MPV  9.7  9.4       Assessment:  Chief Admitting Dx:  Pregnancy  GEL  Labor and delivery, indication for care  Fetal intolerence to Labor  Delivery Type:   for fetal distress  Tubal Ligation:  no    Plan:  Continue routine post partum care.  Advance care, encourage breastfeeding, ambulation  Anticipate discharge on POD#3    MATIAS Adams.         follow-up with mammogram.  She did have a colonoscopy 2016 showed no polyps recommended repeat in 10 years will be due for colonoscopy 2026.  Patient is currently on hydroxychloroquine 4 Sjogren's currently not on any prednisone.  She is taking hydrochlorothiazide for hypertension.  No complaints of any headaches no dizziness no visual problems.  No complaints of any chest pain no shortness of breath no orthopnea no PND.  She is very active and exercises on a regular basis.  Does have some stiffness and joint pains in her lower back and hips has history of lumbar DJD and osteoarthritis.  No complaints of leg weakness no urinary incontinence.  Patient denies feeling depressed no problems with her memory appetite is good weight is stable       Medications  Medications were reviewed and updated today.    Histories  I have personally reviewed and updated the patient's past medical, past surgical, family and social histories during today's visit.    Review of Systems   Constitutional: Negative.    HENT: Negative.    Eyes: Negative.    Respiratory: Negative for cough, shortness of breath and wheezing.    Cardiovascular: Negative for chest pain, palpitations and leg swelling.   Gastrointestinal: Negative.    Endocrine: Negative.    Genitourinary: Negative.    Musculoskeletal: Positive for arthralgias. Negative for back pain and gait problem.   Skin: Negative.    Allergic/Immunologic: Negative.    Neurological: Negative for dizziness, weakness, light-headedness, numbness and headaches.   Hematological: Negative.    Psychiatric/Behavioral: Negative.        Objective   Visit Vitals  /63 (BP Location: LUE - Left upper extremity, Patient Position: Sitting, Cuff Size: Small Adult)   Pulse 64   Temp 97.8 °F (36.6 °C)   Resp 17   Ht 5' 4\" (1.626 m)   Wt 72.8 kg (160 lb 9.7 oz)   SpO2 98%   BMI 27.57 kg/m²     Physical Exam  Vitals reviewed.   Constitutional:       General: She is not in acute distress.     Appearance: Normal  appearance. She is not ill-appearing.   HENT:      Head: Normocephalic and atraumatic.   Eyes:      Conjunctiva/sclera: Conjunctivae normal.      Pupils: Pupils are equal, round, and reactive to light.   Cardiovascular:      Rate and Rhythm: Regular rhythm.      Heart sounds: No murmur heard.  Pulmonary:      Effort: Pulmonary effort is normal. No respiratory distress.      Breath sounds: Normal breath sounds. No wheezing.   Abdominal:      General: Bowel sounds are normal. There is no distension.      Palpations: Abdomen is soft. There is no mass.      Tenderness: There is no abdominal tenderness. There is no guarding.      Hernia: No hernia is present.   Musculoskeletal:      Cervical back: Normal range of motion and neck supple. No rigidity.   Neurological:      Mental Status: She is oriented to person, place, and time. Mental status is at baseline.   Psychiatric:         Mood and Affect: Mood normal.         Laboratory  I have reviewed the pertinent laboratory tests. These are the pertinent findings:  · Sed rate 12  TSH 1.766 GFR greater than 90  Imaging  I have reviewed the pertinent imaging study reports. These are the pertinent findings:  · Mammogram done November 15, 2021 shows no mammographic evidence of malignancy did show dense breast tissue recommend additional screening with ultrasound both breasts  X-ray lumbosacral spine shows degenerative changes of the lumbar spine most significant at L5-S1 area  Assessment & Plan   Diagnoses and associated orders for this visit:  1. Essential hypertension  2. Dense breasts  -     MAMMO SCREENING BILATERAL [facility] [place]  3. Sjogren's syndrome without extraglandular involvement (CMS/HCC)  -     Hydroxychloroquine Sulfate  4. High risk medication use  -     Hydroxychloroquine Sulfate  5. Health care maintenance  -     Lipid Panel With Reflex  -     Thyroid Stimulating Hormone  -     Glycohemoglobin      Essential hypertension good control continue same meds cut down on salt  and caffeine monitor pressure at home follow-up in 3 months        Dense breasts mammogram done November 2021 shows stability will need repeat mammogram and ultrasound both breasts        Sjogren's syndrome stable on Plaquenil has follow-up with rheumatologist annual eye exam        Patient recommended to complete imaging with mammogram has already had the ultrasound for this year just needs to complete mammogram both breasts.      Patient is recommended to start COVID immunization has not yet done so reassured about the safety and no contraindications with her medications for the vaccine risks and benefits discussed with the patient    Orders Placed This Encounter   • MAMMO SCREENING BILATERAL [facility] [place]     Scheduling Instructions:      Instructions for required prep will be provided at time of scheduling imaging exam.            Order Specific Question:   If appropriate for further eval of abnormalities, perform:     Answer:   PERFORM ADD'L DIAG TESTS AND/OR INTERVENTIONAL PROC PER RADIOLOGIST; MAY DO BILATERAL IF INDICATED   • Lipid Panel With Reflex   • Thyroid Stimulating Hormone   • Glycohemoglobin   • hydroCHLOROthiazide (HYDRODIURIL) 12.5 MG tablet     Sig: Take 1 tablet by mouth daily.     Dispense:  30 tablet     Refill:  11   • hydroxychloroquine (PLAQUENIL) 200 MG tablet     Sig: Take 1 tablet by mouth daily.     Dispense:  90 tablet     Refill:  1         Total time spent 40 minutes counseling patient

## 2023-04-17 ENCOUNTER — HOSPITAL ENCOUNTER (OUTPATIENT)
Dept: LAB | Facility: MEDICAL CENTER | Age: 33
End: 2023-04-17
Payer: COMMERCIAL

## 2023-04-17 ENCOUNTER — OFFICE VISIT (OUTPATIENT)
Dept: MEDICAL GROUP | Facility: MEDICAL CENTER | Age: 33
End: 2023-04-17
Payer: COMMERCIAL

## 2023-04-17 ENCOUNTER — HOSPITAL ENCOUNTER (OUTPATIENT)
Facility: MEDICAL CENTER | Age: 33
End: 2023-04-17
Payer: COMMERCIAL

## 2023-04-17 VITALS
HEART RATE: 84 BPM | WEIGHT: 207 LBS | HEIGHT: 58 IN | BODY MASS INDEX: 43.45 KG/M2 | SYSTOLIC BLOOD PRESSURE: 102 MMHG | DIASTOLIC BLOOD PRESSURE: 54 MMHG | OXYGEN SATURATION: 96 % | TEMPERATURE: 97.9 F

## 2023-04-17 DIAGNOSIS — Z23 NEED FOR VACCINATION: ICD-10-CM

## 2023-04-17 DIAGNOSIS — Z11.59 NEED FOR HEPATITIS C SCREENING TEST: ICD-10-CM

## 2023-04-17 DIAGNOSIS — Z12.4 SCREENING FOR CERVICAL CANCER: ICD-10-CM

## 2023-04-17 DIAGNOSIS — Z11.3 SCREENING EXAMINATION FOR SEXUALLY TRANSMITTED DISEASE: ICD-10-CM

## 2023-04-17 DIAGNOSIS — E66.01 MORBID OBESITY WITH BMI OF 40.0-44.9, ADULT (HCC): ICD-10-CM

## 2023-04-17 DIAGNOSIS — Z01.419 WELL WOMAN EXAM WITH ROUTINE GYNECOLOGICAL EXAM: ICD-10-CM

## 2023-04-17 LAB
ALBUMIN SERPL BCP-MCNC: 3.9 G/DL (ref 3.2–4.9)
ALBUMIN/GLOB SERPL: 1 G/DL
ALP SERPL-CCNC: 64 U/L (ref 30–99)
ALT SERPL-CCNC: 13 U/L (ref 2–50)
ANION GAP SERPL CALC-SCNC: 10 MMOL/L (ref 7–16)
AST SERPL-CCNC: 14 U/L (ref 12–45)
BILIRUB SERPL-MCNC: 0.2 MG/DL (ref 0.1–1.5)
BUN SERPL-MCNC: 12 MG/DL (ref 8–22)
CALCIUM ALBUM COR SERPL-MCNC: 8.7 MG/DL (ref 8.5–10.5)
CALCIUM SERPL-MCNC: 8.6 MG/DL (ref 8.5–10.5)
CHLORIDE SERPL-SCNC: 106 MMOL/L (ref 96–112)
CHOLEST SERPL-MCNC: 147 MG/DL (ref 100–199)
CO2 SERPL-SCNC: 24 MMOL/L (ref 20–33)
CREAT SERPL-MCNC: 0.54 MG/DL (ref 0.5–1.4)
ERYTHROCYTE [DISTWIDTH] IN BLOOD BY AUTOMATED COUNT: 44.1 FL (ref 35.9–50)
EST. AVERAGE GLUCOSE BLD GHB EST-MCNC: 105 MG/DL
FASTING STATUS PATIENT QL REPORTED: NORMAL
GFR SERPLBLD CREATININE-BSD FMLA CKD-EPI: 126 ML/MIN/1.73 M 2
GLOBULIN SER CALC-MCNC: 4.1 G/DL (ref 1.9–3.5)
GLUCOSE SERPL-MCNC: 88 MG/DL (ref 65–99)
HBA1C MFR BLD: 5.3 % (ref 4–5.6)
HCT VFR BLD AUTO: 37.2 % (ref 37–47)
HCV AB SER QL: NORMAL
HDLC SERPL-MCNC: 57 MG/DL
HGB BLD-MCNC: 12.3 G/DL (ref 12–16)
HIV 1+2 AB+HIV1 P24 AG SERPL QL IA: NORMAL
LDLC SERPL CALC-MCNC: 84 MG/DL
MCH RBC QN AUTO: 29.7 PG (ref 27–33)
MCHC RBC AUTO-ENTMCNC: 33.1 G/DL (ref 33.6–35)
MCV RBC AUTO: 89.9 FL (ref 81.4–97.8)
PLATELET # BLD AUTO: 365 K/UL (ref 164–446)
PMV BLD AUTO: 10.2 FL (ref 9–12.9)
POTASSIUM SERPL-SCNC: 4 MMOL/L (ref 3.6–5.5)
PROT SERPL-MCNC: 8 G/DL (ref 6–8.2)
RBC # BLD AUTO: 4.14 M/UL (ref 4.2–5.4)
SODIUM SERPL-SCNC: 140 MMOL/L (ref 135–145)
TRIGL SERPL-MCNC: 29 MG/DL (ref 0–149)
TSH SERPL DL<=0.005 MIU/L-ACNC: 1.76 UIU/ML (ref 0.38–5.33)
WBC # BLD AUTO: 9.6 K/UL (ref 4.8–10.8)

## 2023-04-17 PROCEDURE — 90746 HEPB VACCINE 3 DOSE ADULT IM: CPT

## 2023-04-17 PROCEDURE — 87491 CHLMYD TRACH DNA AMP PROBE: CPT

## 2023-04-17 PROCEDURE — 36415 COLL VENOUS BLD VENIPUNCTURE: CPT

## 2023-04-17 PROCEDURE — 84443 ASSAY THYROID STIM HORMONE: CPT

## 2023-04-17 PROCEDURE — 83036 HEMOGLOBIN GLYCOSYLATED A1C: CPT

## 2023-04-17 PROCEDURE — 87591 N.GONORRHOEAE DNA AMP PROB: CPT

## 2023-04-17 PROCEDURE — 86803 HEPATITIS C AB TEST: CPT

## 2023-04-17 PROCEDURE — 85027 COMPLETE CBC AUTOMATED: CPT

## 2023-04-17 PROCEDURE — 90471 IMMUNIZATION ADMIN: CPT

## 2023-04-17 PROCEDURE — 80053 COMPREHEN METABOLIC PANEL: CPT

## 2023-04-17 PROCEDURE — 99395 PREV VISIT EST AGE 18-39: CPT | Mod: 25

## 2023-04-17 PROCEDURE — 87624 HPV HI-RISK TYP POOLED RSLT: CPT

## 2023-04-17 PROCEDURE — 80061 LIPID PANEL: CPT

## 2023-04-17 PROCEDURE — 87389 HIV-1 AG W/HIV-1&-2 AB AG IA: CPT

## 2023-04-17 PROCEDURE — 88175 CYTOPATH C/V AUTO FLUID REDO: CPT

## 2023-04-17 ASSESSMENT — PATIENT HEALTH QUESTIONNAIRE - PHQ9: CLINICAL INTERPRETATION OF PHQ2 SCORE: 0

## 2023-04-17 NOTE — PROGRESS NOTES
"Subjective:     CC: Gynecologic Exam and Requesting Labs       HPI:   Nicole Fishman is a 32 y.o. female who presents for annual exam. 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    Ob-Gyn/ History:    Patient has GYN provider: no  /Para:    Last Pap Smear:  2/3/17. Yes  history of abnormal pap smears, chlamydia.  Gyn Surgery:  None.  Current Contraceptive Method:  None. Not currently sexually active.  Last menstrual period:  4/10/23.  Periods regular. heavy bleeding. 4-5 pads large pads. Cramping is variable.   She do take OTC analgesics for cramps.  No significant bloating/fluid retention, pelvic pain, or dyspareunia. No vaginal discharge  Folate intake: just dietary    Health Maintenance  Cholesterol Screening: Ordered today 23  Diabetes Screening: Ordered today 23  Diet: Tries to eat well, but has been struggling since switching to night shift.   Exercise: not regularly.  Substance Abuse: None  Safe in relationship.   Seat belts, bike helmet, gun safety discussed.  Sun protection used.    Cancer screening  Cervical Cancer Screening: Ordered today 23    Infectious disease screening/Immunizations  --STI Screening: Ordered today 23  --Practices safe sex.  --HIV Screening: Ordered today 23  --Hepatitis C Screening: Ordered today 23  --Immunizations:    Influenza: declined   HPV:  aged out   Tetanus: 17   COVID-19: 2/, due for booster    Allergies: No Known Allergies    Medication: No current outpatient medications on file.      Objective:     /54 (BP Location: Left arm, Patient Position: Sitting, BP Cuff Size: Adult)   Pulse 84   Temp 36.6 °C (97.9 °F) (Temporal)   Ht 1.473 m (4' 10\")   Wt 93.9 kg (207 lb)   SpO2 96%   BMI 43.26 kg/m²   Body mass index is 43.26 kg/m².  Wt Readings from Last 2 Encounters:   23 93.9 kg (207 lb)   22 97.1 kg (214 lb)       Physical Exam:  Constitutional: " Well-developed and well-nourished. Not diaphoretic. No distress.   Skin: Skin is warm and dry. No rash noted.  Head: Atraumatic without lesions.  Eyes: Conjunctivae and extraocular motions are normal. Pupils are equal, round, and reactive to light. No scleral icterus.   Ears:  External ears unremarkable. Tympanic membranes clear and intact.  Nose: Nares patent. Septum midline. Turbinates without erythema nor edema. No discharge.   Mouth/Throat: Dentition is in good repair. Tongue normal. Oropharynx is clear and moist. Posterior pharynx without erythema or exudates.  Neck: Supple, trachea midline. Normal range of motion. No thyromegaly present. No lymphadenopathy--cervical or supraclavicular.  Cardiovascular: Regular rate and rhythm, S1 and S2 without murmur, rubs, or gallops.  Lungs: Normal inspiratory effort, CTA bilaterally, no wheezes/rhonchi/rales  Abdomen: Soft, non tender, and without distention. Active bowel sounds in all four quadrants. No rebound, guarding, masses or HSM.  :Perineum and external genitalia normal without rash. Vagina with scant, yellow, and thin discharge. Cervix without visible lesions or discharge.   Extremities: No cyanosis, clubbing, erythema, nor edema. Distal pulses intact and symmetric.   Musculoskeletal: All major joints AROM full in all directions without pain.  Neurological: Alert and oriented x 3. DTRs 2+/3 and symmetric. No cranial nerve deficit. 5/5 myotomes. Sensation intact.   Psychiatric:  Behavior, mood, and affect are appropriate.    Assessment and Plan:     Nicole is a 32 y.o. female with the following -     1. Well woman exam with routine gynecological exam  Stable    2. Screening for cervical cancer  - THINPREP PAP W/HPV AND CTNG; Future    3. Morbid obesity with BMI of 40.0-44.9, adult (HCC)  Chronic, stable  - TSH WITH REFLEX TO FT4; Future  - Comp Metabolic Panel; Future  - Lipid Profile; Future  - CBC WITHOUT DIFFERENTIAL; Future  - Patient identified as having  weight management issue.  Appropriate orders and counseling given.  - HEMOGLOBIN A1C; Future    4. Screening examination for sexually transmitted disease  - T.PALLIDUM AB GRAZYNA (SCREENING)  - HIV AG/AB COMBO ASSAY SCREENING; Future    5. Need for vaccination  - Hepatitis B Vaccine Adult IM    6. Need for hepatitis C screening test  - HEP C VIRUS ANTIBODY; Future      A chaperone was offered to the patient during today's exam. Chaperone name: SARAH Hooper was present.    HCM:     Labs per orders  Immunizations per orders    Anticipatory guidance  Patient counseled about skin care, diet, supplements, prenatal vitamins, safe sex and exercise, smoking, drugs/alcohol use, and wearing a seat belt.       Follow-up: Return if symptoms worsen or fail to improve.

## 2023-04-18 DIAGNOSIS — Z12.4 SCREENING FOR CERVICAL CANCER: ICD-10-CM

## 2023-04-18 LAB
C TRACH DNA GENITAL QL NAA+PROBE: NEGATIVE
CYTOLOGY REG CYTOL: NORMAL
HPV HR 12 DNA CVX QL NAA+PROBE: NEGATIVE
HPV16 DNA SPEC QL NAA+PROBE: NEGATIVE
HPV18 DNA SPEC QL NAA+PROBE: NEGATIVE
N GONORRHOEA DNA GENITAL QL NAA+PROBE: NEGATIVE
SPECIMEN SOURCE: NORMAL
SPECIMEN SOURCE: NORMAL

## 2023-05-30 ENCOUNTER — APPOINTMENT (OUTPATIENT)
Dept: MEDICAL GROUP | Facility: MEDICAL CENTER | Age: 33
End: 2023-05-30
Payer: COMMERCIAL

## 2023-06-01 ENCOUNTER — APPOINTMENT (OUTPATIENT)
Dept: MEDICAL GROUP | Facility: MEDICAL CENTER | Age: 33
End: 2023-06-01
Payer: COMMERCIAL

## 2023-06-02 ENCOUNTER — OFFICE VISIT (OUTPATIENT)
Dept: URGENT CARE | Facility: PHYSICIAN GROUP | Age: 33
End: 2023-06-02
Payer: COMMERCIAL

## 2023-06-02 VITALS
HEART RATE: 96 BPM | SYSTOLIC BLOOD PRESSURE: 126 MMHG | DIASTOLIC BLOOD PRESSURE: 82 MMHG | WEIGHT: 207 LBS | HEIGHT: 58 IN | BODY MASS INDEX: 43.45 KG/M2 | RESPIRATION RATE: 16 BRPM | OXYGEN SATURATION: 96 % | TEMPERATURE: 98.3 F

## 2023-06-02 DIAGNOSIS — J40 BRONCHITIS: ICD-10-CM

## 2023-06-02 PROCEDURE — 99213 OFFICE O/P EST LOW 20 MIN: CPT

## 2023-06-02 PROCEDURE — 3074F SYST BP LT 130 MM HG: CPT

## 2023-06-02 PROCEDURE — 3079F DIAST BP 80-89 MM HG: CPT

## 2023-06-02 RX ORDER — BENZONATATE 100 MG/1
100 CAPSULE ORAL 3 TIMES DAILY PRN
Qty: 30 CAPSULE | Refills: 0 | Status: SHIPPED | OUTPATIENT
Start: 2023-06-02 | End: 2024-02-29

## 2023-06-02 RX ORDER — ALBUTEROL SULFATE 90 UG/1
2 AEROSOL, METERED RESPIRATORY (INHALATION) EVERY 6 HOURS PRN
Qty: 8.5 G | Refills: 0 | Status: SHIPPED | OUTPATIENT
Start: 2023-06-02

## 2023-06-02 RX ORDER — METHYLPREDNISOLONE 4 MG/1
TABLET ORAL
Qty: 21 TABLET | Refills: 0 | Status: SHIPPED | OUTPATIENT
Start: 2023-06-02 | End: 2024-02-29

## 2023-06-02 ASSESSMENT — ENCOUNTER SYMPTOMS
FEVER: 0
WEIGHT LOSS: 0
STRIDOR: 0
WHEEZING: 1
SPUTUM PRODUCTION: 0
SINUS PAIN: 0
HEMOPTYSIS: 0
SORE THROAT: 0
DIAPHORESIS: 0
SHORTNESS OF BREATH: 1
COUGH: 1
CHILLS: 0

## 2023-06-02 ASSESSMENT — FIBROSIS 4 INDEX: FIB4 SCORE: 0.33

## 2023-06-02 NOTE — LETTER
June 2, 2023    To Whom It May Concern:         This is confirmation that Nicole Dobson Yue Marquezon attended her scheduled appointment with RICKY Moore on 6/02/23. Please excuse her from work 6/2/23.         If you have any questions please do not hesitate to call me at the phone number listed below.    Sincerely,          MATIAS Moore.  [phone]

## 2023-06-03 NOTE — PROGRESS NOTES
Subjective:   Nicole Persaud is a 32 y.o. female who presents for Cough (Started Saturday , chest tightness from coughing too much, weakness, losing voice)      HPI: This is a 32-year-old female who presents today for cough.  Patient reports cough x1 week.  She reports associated wheezing and shortness of breath.  She reports fevers and chills have resolved.  She has taken over-the-counter cough medication which have not been helpful.  She reports having a negative home COVID test.  She has had recent travel.  She denies known sick contacts.  No underlying history of asthma or COPD.    Review of Systems   Constitutional:  Negative for chills, diaphoresis, fever, malaise/fatigue and weight loss.   HENT:  Negative for congestion, ear discharge, ear pain, nosebleeds, sinus pain and sore throat.    Respiratory:  Positive for cough, shortness of breath and wheezing. Negative for hemoptysis, sputum production and stridor.    All other systems reviewed and are negative.      Medications:    No current outpatient medications on file prior to visit.     No current facility-administered medications on file prior to visit.        Allergies:   Patient has no known allergies.    Problem List:   Patient Active Problem List   Diagnosis    Morbid obesity with BMI of 40.0-44.9, adult (HCC)    Routine general medical examination at health care facility    Weight gain    Shortness of breath on exertion        Surgical History:  Past Surgical History:   Procedure Laterality Date    PRIMARY C SECTION  2017    Procedure: PRIMARY C SECTION;  Surgeon: Marisela Romero M.D.;  Location: [facility];  Service:        Past Social Hx:   Social History     Tobacco Use    Smoking status: Former     Types: Cigarettes     Quit date:      Years since quittin.4    Smokeless tobacco: Never    Tobacco comments:     occasionally   Vaping Use    Vaping Use: Never used   Substance Use Topics    Alcohol use: Not  "Currently    Drug use: No          Problem list, medications, and allergies reviewed by myself today in Epic.     Objective:     /82 (BP Location: Right arm, Patient Position: Sitting, BP Cuff Size: Large adult)   Pulse 96   Temp 36.8 °C (98.3 °F) (Temporal)   Resp 16   Ht 1.473 m (4' 10\")   Wt 93.9 kg (207 lb)   SpO2 96%   BMI 43.26 kg/m²     Physical Exam  Vitals and nursing note reviewed.   Constitutional:       General: She is not in acute distress.     Appearance: Normal appearance. She is not ill-appearing, toxic-appearing or diaphoretic.   HENT:      Head: Normocephalic and atraumatic.      Right Ear: Tympanic membrane, ear canal and external ear normal. There is no impacted cerumen.      Left Ear: Tympanic membrane, ear canal and external ear normal. There is no impacted cerumen.      Nose: Nose normal. No congestion or rhinorrhea.      Mouth/Throat:      Mouth: Mucous membranes are moist.      Pharynx: Oropharynx is clear. No oropharyngeal exudate or posterior oropharyngeal erythema.   Cardiovascular:      Rate and Rhythm: Normal rate and regular rhythm.      Pulses: Normal pulses.      Heart sounds: Normal heart sounds. No murmur heard.     No friction rub. No gallop.   Pulmonary:      Effort: Pulmonary effort is normal. No respiratory distress.      Breath sounds: Normal breath sounds. No stridor. No wheezing, rhonchi or rales.   Chest:      Chest wall: No tenderness.   Musculoskeletal:      Cervical back: Neck supple. No tenderness.   Lymphadenopathy:      Cervical: No cervical adenopathy.   Skin:     General: Skin is warm and dry.      Capillary Refill: Capillary refill takes less than 2 seconds.   Neurological:      General: No focal deficit present.      Mental Status: She is alert and oriented to person, place, and time. Mental status is at baseline.      Cranial Nerves: No cranial nerve deficit.      Motor: No weakness.      Gait: Gait normal.   Psychiatric:         Mood and Affect: " Mood normal.         Behavior: Behavior normal.         Thought Content: Thought content normal.         Judgment: Judgment normal.         Assessment/Plan:     Diagnosis and associated orders:   1. Bronchitis  albuterol 108 (90 Base) MCG/ACT Aero Soln inhalation aerosol    benzonatate (TESSALON) 100 MG Cap    methylPREDNISolone (MEDROL DOSEPAK) 4 MG Tablet Therapy Pack             Comments/MDM:   Pt is clinically stable at today's acute urgent care visit.  No acute distress noted. Appropriate for outpatient management at this time.     Acute problem.  Patient presents today for dry nonproductive cough x1 week.  She reports associated wheezing and shortness of breath.  Patient will be treated with Medrol Dosepak, albuterol inhaler, and Tessalon for cough.  Advised patient begin taking medications as prescribed, stay well-hydrated, use humidifier. Patient is to return to  or go to ER for any new or worsening signs or symptoms, and follow with with PCP for recheck. Patient is agreeable with plan of care and verbalizes good understanding.             Discussed DDx, management options (risks,benefits, and alternatives to planned treatment), natural progression and supportive care.  Expressed understanding and the treatment plan was agreed upon. Questions were encouraged and answered   Return to urgent care prn if new or worsening sx or if there is no improvement in condition prn.    Educated in Red flags and indications to immediately call 911 or present to the Emergency Department.   Advised the patient to follow-up with the primary care physician for recheck, reevaluation, and consideration of further management.    I personally reviewed prior external notes and test results pertinent to today's visit.  I have independently reviewed and interpreted all diagnostics ordered during this urgent care acute visit.       Please note that this dictation was created using voice recognition software. I have made a reasonable  attempt to correct obvious errors, but I expect that there are errors of grammar and possibly content that I did not discover before finalizing the note.    This note was electronically signed by SHON Zhao

## 2023-06-05 ENCOUNTER — APPOINTMENT (OUTPATIENT)
Dept: MEDICAL GROUP | Facility: MEDICAL CENTER | Age: 33
End: 2023-06-05
Payer: COMMERCIAL

## 2023-06-05 ENCOUNTER — TELEPHONE (OUTPATIENT)
Dept: URGENT CARE | Facility: PHYSICIAN GROUP | Age: 33
End: 2023-06-05

## 2023-06-05 NOTE — TELEPHONE ENCOUNTER
1. Name: Nicole Peligros Gecale Patterson      Call Back Number: [phone] (home)         How would the patient prefer to be contacted with a response: Bri message    Pt called stating if you can write her a note for work excusing her from work from 06/02/2023-06/07/2023 pt is stating that she isn't feeling any better

## 2023-06-06 ENCOUNTER — TELEPHONE (OUTPATIENT)
Dept: URGENT CARE | Facility: PHYSICIAN GROUP | Age: 33
End: 2023-06-06

## 2023-06-06 NOTE — TELEPHONE ENCOUNTER
Patient called 6/6/23 asking for an extended note for 6/3/-/6/4 she is still not feeling better, thank you.

## 2023-06-07 ENCOUNTER — OFFICE VISIT (OUTPATIENT)
Dept: MEDICAL GROUP | Facility: MEDICAL CENTER | Age: 33
End: 2023-06-07
Payer: COMMERCIAL

## 2023-06-07 VITALS
HEART RATE: 89 BPM | DIASTOLIC BLOOD PRESSURE: 84 MMHG | SYSTOLIC BLOOD PRESSURE: 124 MMHG | WEIGHT: 203.6 LBS | TEMPERATURE: 97.6 F | OXYGEN SATURATION: 97 % | BODY MASS INDEX: 41.04 KG/M2 | HEIGHT: 59 IN | RESPIRATION RATE: 20 BRPM

## 2023-06-07 DIAGNOSIS — J02.0 STREP PHARYNGITIS: ICD-10-CM

## 2023-06-07 DIAGNOSIS — J40 BRONCHITIS: ICD-10-CM

## 2023-06-07 DIAGNOSIS — J02.9 SORE THROAT: ICD-10-CM

## 2023-06-07 DIAGNOSIS — B34.9 RECURRENT VIRAL INFECTION: ICD-10-CM

## 2023-06-07 LAB — S PYO DNA SPEC NAA+PROBE: DETECTED

## 2023-06-07 PROCEDURE — 3074F SYST BP LT 130 MM HG: CPT | Performed by: NURSE PRACTITIONER

## 2023-06-07 PROCEDURE — 87651 STREP A DNA AMP PROBE: CPT | Performed by: NURSE PRACTITIONER

## 2023-06-07 PROCEDURE — 3079F DIAST BP 80-89 MM HG: CPT | Performed by: NURSE PRACTITIONER

## 2023-06-07 PROCEDURE — 99214 OFFICE O/P EST MOD 30 MIN: CPT | Performed by: NURSE PRACTITIONER

## 2023-06-07 RX ORDER — AZITHROMYCIN 250 MG/1
TABLET, FILM COATED ORAL
Qty: 6 TABLET | Refills: 0 | Status: CANCELLED | OUTPATIENT
Start: 2023-06-07 | End: 2023-06-12

## 2023-06-07 RX ORDER — FLUTICASONE PROPIONATE 50 MCG
1 SPRAY, SUSPENSION (ML) NASAL DAILY
Qty: 16 G | Refills: 0 | Status: CANCELLED | OUTPATIENT
Start: 2023-06-07

## 2023-06-07 RX ORDER — AMOXICILLIN AND CLAVULANATE POTASSIUM 875; 125 MG/1; MG/1
1 TABLET, FILM COATED ORAL 2 TIMES DAILY
Qty: 20 TABLET | Refills: 0 | Status: SHIPPED | OUTPATIENT
Start: 2023-06-07 | End: 2023-06-17

## 2023-06-07 ASSESSMENT — FIBROSIS 4 INDEX: FIB4 SCORE: 0.33

## 2023-06-07 NOTE — ASSESSMENT & PLAN NOTE
Ongoing for the past 2 weeks. Seen in urgent care 5 days ago, diagnosed with bronchitis. Prescribed tessalon, albuterol inhaler, medrol dosepak.     She has been out of work for the past week. Requesting work note.

## 2023-06-07 NOTE — LETTER
MATIAS Crawford.  [place] [id number] [place] [address]  Phone: [phone] - Fax: [phone]          June 7, 2023       Patient: Nicole Persaud   YOB: 1990   Date of Visit: 6/7/2023         To Whom It May Concern:    Please excuse Nicole Persaud from work starting 5/31/2023-6/6/2023, she has been sick. [facility] paperwork completed for intermittent leave.     If you have any questions or concerns, please don't hesitate to call [phone]          Sincerely,          RICKY Crawford  Electronically Signed

## 2023-06-07 NOTE — ASSESSMENT & PLAN NOTE
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 She is having significant fatigue despite adequate sleep, weakness, low motivation, non productive. She has been having to call out of work frequently due to illness, fatigue. Requesting [facility] paperwork today.

## 2023-06-08 NOTE — PROGRESS NOTES
"Subjective:   Nicole Persaud is a 32 y.o. female here today for FMLA, sore throat    Bronchitis  Ongoing for the past 2 weeks. Seen in urgent care 5 days ago, diagnosed with bronchitis. Prescribed tessalon, albuterol inhaler, medrol dosepak.     She has been out of work for the past week. Requesting work note.     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 She is having significant fatigue despite adequate sleep, weakness, low motivation, non productive. She has been having to call out of work frequently due to illness, fatigue. Requesting FMLA paperwork today.      Current medicines (including changes today)  Current Outpatient Medications   Medication Sig Dispense Refill    amoxicillin-clavulanate (AUGMENTIN) 875-125 MG Tab Take 1 Tablet by mouth 2 times a day for 10 days. 20 Tablet 0    albuterol 108 (90 Base) MCG/ACT Aero Soln inhalation aerosol Inhale 2 Puffs every 6 hours as needed for Shortness of Breath. 8.5 g 0    benzonatate (TESSALON) 100 MG Cap Take 1 Capsule by mouth 3 times a day as needed for Cough. 30 Capsule 0    methylPREDNISolone (MEDROL DOSEPAK) 4 MG Tablet Therapy Pack Follow schedule on package instructions. 21 Tablet 0     No current facility-administered medications for this visit.     She  has a past medical history of H/O bladder infections (2016) and Migraine (2016).    ROS   No chest pain, no shortness of breath, no abdominal pain  Positive ROS as per HPI.  All other systems reviewed and are negative.     Objective:     /84 (BP Location: Right arm, Patient Position: Sitting, BP Cuff Size: Adult)   Pulse 89   Temp 36.4 °C (97.6 °F) (Temporal)   Resp 20   Ht 1.499 m (4' 11\")   Wt 92.4 kg (203 lb 9.6 oz)   SpO2 97%  Body mass index is 41.12 kg/m².     Physical Exam:  Constitutional: Alert, no distress.  Skin: Warm, dry, good turgor, no rashes in visible " areas.  Eye: Equal, round and reactive, conjunctiva clear, lids normal.  ENMT: Lips without lesions, good dentition, pharyngeal erythema, grade 2-3 tonsillar enlargement, cryptic tonsils with exudates  Neck: Trachea midline, no masses, no thyromegaly. + cervical lymphadenopathy  Respiratory: Unlabored respiratory effort, lungs clear to auscultation, no wheezes, no ronchi.  Cardiovascular: Normal S1, S2, no murmur, no edema.  Psych: Alert and oriented x3, normal affect and mood.      Assessment and Plan:   The following treatment plan was discussed    1. Strep pharyngitis  Unstable  POC strep positive  Augmentin twice daily for 10 days  Additional supportive care advised  - amoxicillin-clavulanate (AUGMENTIN) 875-125 MG Tab; Take 1 Tablet by mouth 2 times a day for 10 days.  Dispense: 20 Tablet; Refill: 0    2. Sore throat  - POCT CEPHEID GROUP A STREP - PCR    3. Bronchitis  Stable, continue current medications    4. Recurrent viral infection  FMLA paperwork completed, advised that she switch from night shift to day shift for better overall health.      Followup: Return if symptoms worsen or fail to improve.    The MA is performing the above orders under the direction of Dr. Perera    Please note that this dictation was created using voice recognition software. I have made every reasonable attempt to correct obvious errors, but I expect that there are errors of grammar and possibly content that I did not discover before finalizing the note.

## 2024-02-04 NOTE — PROGRESS NOTES
2345 report from ÁLVARO Salmon RN, IV started and admit completed, patient repositioned and fluid bolus initiated.  0004 patient repositioned on left side  0008 patient repositioned to right side  0108 patient repositioned to left side  0119 patient repositioned to right side  0135 patient repositioned to left side, O2 applied and bolus already in place  0220 Dr Romero updated and orders received.  0230 Pitocin started per protocol  0312 patient up to the bathroom  0327 patient back from the restroom  0344 patient having prolonged deceleration, Dr Romero updated   0352 C/S called, will go back when baby recovers  0421 patient to OR 2 via wheelchair  0424 patient in OR 2  0530 patient our of OR 2 to PACU   [facility] ICU VENTILATOR RESPIRATORY NOTE  Date of Admission: 1/26/2024  Date of Intubation (most recent): 1/28/2024  Reason for Mechanical Ventilation: Respiratory Failure   Number of Days on Mechanical Ventilation: 8  Met Criteria for Pressure Support Trial: yes  Length of Pressure Support Trial: PS 10/10 for 2 hours 17 min    Reason for Stopping Pressure Support Trial: increased HR. End of designated trial.  Significant Events Today: FiO2 had to be increased to 30%.     Pt remains intubated on CMV 15/350/+10/30%. BS coarse, suctioned for small-large amount of thick cloudy white secretions from ETT. Continues to receive duoneb QID.

## 2024-02-29 ENCOUNTER — OFFICE VISIT (OUTPATIENT)
Dept: MEDICAL GROUP | Facility: MEDICAL CENTER | Age: 34
End: 2024-02-29
Payer: COMMERCIAL

## 2024-02-29 VITALS
BODY MASS INDEX: 48.07 KG/M2 | SYSTOLIC BLOOD PRESSURE: 118 MMHG | HEIGHT: 58 IN | TEMPERATURE: 97 F | DIASTOLIC BLOOD PRESSURE: 64 MMHG | HEART RATE: 101 BPM | WEIGHT: 229 LBS | OXYGEN SATURATION: 97 %

## 2024-02-29 DIAGNOSIS — R63.5 WEIGHT GAIN: ICD-10-CM

## 2024-02-29 DIAGNOSIS — M79.4: ICD-10-CM

## 2024-02-29 DIAGNOSIS — E66.01 MORBID OBESITY WITH BMI OF 45.0-49.9, ADULT (HCC): ICD-10-CM

## 2024-02-29 DIAGNOSIS — Z00.00 ANNUAL PHYSICAL EXAM: ICD-10-CM

## 2024-02-29 DIAGNOSIS — Z23 NEED FOR VACCINATION: ICD-10-CM

## 2024-02-29 DIAGNOSIS — R20.2 NUMBNESS AND TINGLING: ICD-10-CM

## 2024-02-29 DIAGNOSIS — R20.0 NUMBNESS AND TINGLING: ICD-10-CM

## 2024-02-29 PROCEDURE — 90746 HEPB VACCINE 3 DOSE ADULT IM: CPT | Performed by: NURSE PRACTITIONER

## 2024-02-29 PROCEDURE — 3078F DIAST BP <80 MM HG: CPT | Performed by: NURSE PRACTITIONER

## 2024-02-29 PROCEDURE — 99214 OFFICE O/P EST MOD 30 MIN: CPT | Mod: 25 | Performed by: NURSE PRACTITIONER

## 2024-02-29 PROCEDURE — 3074F SYST BP LT 130 MM HG: CPT | Performed by: NURSE PRACTITIONER

## 2024-02-29 PROCEDURE — 90471 IMMUNIZATION ADMIN: CPT | Performed by: NURSE PRACTITIONER

## 2024-02-29 ASSESSMENT — PATIENT HEALTH QUESTIONNAIRE - PHQ9: CLINICAL INTERPRETATION OF PHQ2 SCORE: 0

## 2024-02-29 ASSESSMENT — FIBROSIS 4 INDEX: FIB4 SCORE: 0.34

## 2024-03-01 NOTE — PROGRESS NOTES
Subjective:     History of Present Illness  The patient presents to discuss weight gain.    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 She is not as stressed anymore, but her legs and arms do not look normal to her anymore. She tries to walk at work, take the stairs a couple of floors instead of just elevators, and hydrate herself. She does not have the time to go to the gym or do heavy exercise. She tried intermittent fasting for a couple of months, which worked for her, but it did not last long. She lost weight from 214 pounds to 198 pounds, but her arms and legs were still the same. Her stomach was a little bit flatter and her face was smaller. She has stretch marks on her abdomen from pregnancy, they were not there prior. She has tingling and numbness in her bilateral legs and feet due to the heaviness and increased size. Sometimes, she would not walk because she could not feel her legs. Sometimes, she has pins and needles.She tried the pinch test for lipedema. She also did a lymphatic massage, which helped with the swelling. She urinates a lot after the massage. She bruises easily, but that is when she is almost on her period. Her arms are more sensitive to touch. She denies any chance of pregnancy. She is not sexually active. She is not depressed.     She denies any family history of lymphedema.   [facility]      Current medicines (including changes today)  Current Outpatient Medications   Medication Sig Dispense Refill    albuterol 108 (90 Base) MCG/ACT Aero Soln inhalation aerosol Inhale 2 Puffs every 6 hours as needed for Shortness of Breath. 8.5 g 0     No current facility-administered medications for this visit.     She  has a past medical history of H/O bladder infections (2016) and Migraine (2016).    ROS   No chest pain, no shortness of breath, no abdominal pain  Positive ROS as per HPI.  All  "other systems reviewed and are negative.     Objective:     /64   Pulse (!) 101   Temp 36.1 °C (97 °F) (Temporal)   Ht 1.473 m (4' 10\")   Wt 104 kg (229 lb)   SpO2 97%  Body mass index is 47.86 kg/m².   Physical Exam    Constitutional: Alert, no distress.  Skin: Warm, dry, good turgor, no rashes in visible areas.  Eye: Equal, round and reactive, conjunctiva clear, lids normal.  ENMT: Lips without lesions, good dentition, oropharynx clear.  Neck: Trachea midline, no masses, no thyromegaly. No cervical or supraclavicular lymphadenopathy  Respiratory: Unlabored respiratory effort, lungs clear to auscultation, no wheezes, no ronchi.  Cardiovascular: Normal S1, S2, no murmur, no edema.  Abdomen: Soft, non-tender, no masses, no hepatosplenomegaly.  Psych: Alert and oriented x3, normal affect and mood.  Extremities: Hypertrophy of ilateral infrapatellar fat pad of both knees. Normal feet.     Results  Laboratory Studies  Labs were reviewed with the patient.      Assessment and Plan:   The following treatment plan was discussed    Assessment & Plan  1. Weight gain.  There are a few conditions that can cause weight gain. She was advised to shorten her eating time. I will get some updated blood work. I will order a lymphedema test. She was advised to increase her exercise. Depending on the results of the lymphedema test, we will decide what we need to do as far as treatment.  - TSH; Future  - FREE THYROXINE; Future  - HEMOGLOBIN A1C; Future  - TRIIDOTHYRONINE; Future  - THYROID PEROXIDASE  (TPO) AB; Future  - ANTITHYROGLOBULIN AB; Future    2. Morbid obesity with BMI of 45.0-49.9, adult (HCC)  Unstable  Obesity vs lipedema, will check lymphoscintigraphy to differentiate  - Patient identified as having weight management issue.  Appropriate orders and counseling given.  - NM-LYMPHOSCINTIGRAPHY; Future    3. Numbness and tingling  Unstable  Due to LE heaviness  Check labs and NM testing  - FERRITIN; Future  - VITAMIN " B12; Future  - IRON/TOTAL IRON BIND; Future  - NM-LYMPHOSCINTIGRAPHY; Future    4. Hypertrophy of knee fat pad  - NM-LYMPHOSCINTIGRAPHY; Future    5. Hypertrophy of infrapatellar fat pad of both knees  - NM-LYMPHOSCINTIGRAPHY; Future    6. Annual physical exam  - CBC WITH DIFFERENTIAL; Future  - Comp Metabolic Panel; Future  - TSH; Future  - FREE THYROXINE; Future  - HEMOGLOBIN A1C; Future  - TRIIDOTHYRONINE; Future  - THYROID PEROXIDASE  (TPO) AB; Future  - ANTITHYROGLOBULIN AB; Future  - FERRITIN; Future  - VITAMIN B12; Future  - IRON/TOTAL IRON BIND; Future    7. Need for vaccination  - Hep B Adult 20+        Follow-up  The patient will follow up in 4 weeks.      The MA is performing the above orders under the direction of Dr. Perera    Please note that this dictation was created using voice recognition software. I have made every reasonable attempt to correct obvious errors, but I expect that there are errors of grammar and possibly content that I did not discover before finalizing the note.      Attestation      Verbal consent was acquired by the patient to use [employer] ambient listening note generation during this visit Yes

## 2024-03-08 ENCOUNTER — APPOINTMENT (OUTPATIENT)
Dept: LAB | Facility: MEDICAL CENTER | Age: 34
End: 2024-03-08
Payer: COMMERCIAL

## 2024-03-14 ENCOUNTER — HOSPITAL ENCOUNTER (OUTPATIENT)
Dept: RADIOLOGY | Facility: MEDICAL CENTER | Age: 34
End: 2024-03-14
Attending: NURSE PRACTITIONER
Payer: COMMERCIAL

## 2024-03-14 DIAGNOSIS — M79.4: ICD-10-CM

## 2024-03-14 DIAGNOSIS — R20.2 NUMBNESS AND TINGLING: ICD-10-CM

## 2024-03-14 DIAGNOSIS — R20.0 NUMBNESS AND TINGLING: ICD-10-CM

## 2024-03-14 DIAGNOSIS — E66.01 MORBID OBESITY WITH BMI OF 45.0-49.9, ADULT (HCC): ICD-10-CM

## 2024-03-14 PROCEDURE — A9541 TC99M SULFUR COLLOID: HCPCS

## 2024-03-18 ENCOUNTER — HOSPITAL ENCOUNTER (OUTPATIENT)
Dept: LAB | Facility: MEDICAL CENTER | Age: 34
End: 2024-03-18
Attending: NURSE PRACTITIONER
Payer: COMMERCIAL

## 2024-03-18 DIAGNOSIS — R63.5 WEIGHT GAIN: ICD-10-CM

## 2024-03-18 DIAGNOSIS — R20.0 NUMBNESS AND TINGLING: ICD-10-CM

## 2024-03-18 DIAGNOSIS — Z00.00 ANNUAL PHYSICAL EXAM: ICD-10-CM

## 2024-03-18 DIAGNOSIS — R20.2 NUMBNESS AND TINGLING: ICD-10-CM

## 2024-03-18 LAB
ALBUMIN SERPL BCP-MCNC: 4.1 G/DL (ref 3.2–4.9)
ALBUMIN/GLOB SERPL: 1 G/DL
ALP SERPL-CCNC: 63 U/L (ref 30–99)
ALT SERPL-CCNC: 19 U/L (ref 2–50)
ANION GAP SERPL CALC-SCNC: 12 MMOL/L (ref 7–16)
AST SERPL-CCNC: 17 U/L (ref 12–45)
BASOPHILS # BLD AUTO: 0.4 % (ref 0–1.8)
BASOPHILS # BLD: 0.06 K/UL (ref 0–0.12)
BILIRUB SERPL-MCNC: 0.2 MG/DL (ref 0.1–1.5)
BUN SERPL-MCNC: 11 MG/DL (ref 8–22)
CALCIUM ALBUM COR SERPL-MCNC: 9.1 MG/DL (ref 8.5–10.5)
CALCIUM SERPL-MCNC: 9.2 MG/DL (ref 8.5–10.5)
CHLORIDE SERPL-SCNC: 102 MMOL/L (ref 96–112)
CO2 SERPL-SCNC: 24 MMOL/L (ref 20–33)
CREAT SERPL-MCNC: 0.85 MG/DL (ref 0.5–1.4)
EOSINOPHIL # BLD AUTO: 0.15 K/UL (ref 0–0.51)
EOSINOPHIL NFR BLD: 1 % (ref 0–6.9)
ERYTHROCYTE [DISTWIDTH] IN BLOOD BY AUTOMATED COUNT: 43.2 FL (ref 35.9–50)
EST. AVERAGE GLUCOSE BLD GHB EST-MCNC: 114 MG/DL
FERRITIN SERPL-MCNC: 68.1 NG/ML (ref 10–291)
GFR SERPLBLD CREATININE-BSD FMLA CKD-EPI: 93 ML/MIN/1.73 M 2
GLOBULIN SER CALC-MCNC: 4.1 G/DL (ref 1.9–3.5)
GLUCOSE SERPL-MCNC: 108 MG/DL (ref 65–99)
HBA1C MFR BLD: 5.6 % (ref 4–5.6)
HCT VFR BLD AUTO: 40.7 % (ref 37–47)
HGB BLD-MCNC: 12.8 G/DL (ref 12–16)
IMM GRANULOCYTES # BLD AUTO: 0.06 K/UL (ref 0–0.11)
IMM GRANULOCYTES NFR BLD AUTO: 0.4 % (ref 0–0.9)
IRON SATN MFR SERPL: 18 % (ref 15–55)
IRON SERPL-MCNC: 64 UG/DL (ref 40–170)
LYMPHOCYTES # BLD AUTO: 3.73 K/UL (ref 1–4.8)
LYMPHOCYTES NFR BLD: 24.8 % (ref 22–41)
MCH RBC QN AUTO: 28.6 PG (ref 27–33)
MCHC RBC AUTO-ENTMCNC: 31.4 G/DL (ref 32.2–35.5)
MCV RBC AUTO: 91.1 FL (ref 81.4–97.8)
MONOCYTES # BLD AUTO: 0.86 K/UL (ref 0–0.85)
MONOCYTES NFR BLD AUTO: 5.7 % (ref 0–13.4)
NEUTROPHILS # BLD AUTO: 10.2 K/UL (ref 1.82–7.42)
NEUTROPHILS NFR BLD: 67.7 % (ref 44–72)
NRBC # BLD AUTO: 0 K/UL
NRBC BLD-RTO: 0 /100 WBC (ref 0–0.2)
PLATELET # BLD AUTO: 467 K/UL (ref 164–446)
PMV BLD AUTO: 10.1 FL (ref 9–12.9)
POTASSIUM SERPL-SCNC: 3.6 MMOL/L (ref 3.6–5.5)
PROT SERPL-MCNC: 8.2 G/DL (ref 6–8.2)
RBC # BLD AUTO: 4.47 M/UL (ref 4.2–5.4)
SODIUM SERPL-SCNC: 138 MMOL/L (ref 135–145)
T3 SERPL-MCNC: 105 NG/DL (ref 60–181)
T4 FREE SERPL-MCNC: 1.12 NG/DL (ref 0.93–1.7)
THYROPEROXIDASE AB SERPL-ACNC: 73 IU/ML (ref 0–9)
TIBC SERPL-MCNC: 353 UG/DL (ref 250–450)
TSH SERPL DL<=0.005 MIU/L-ACNC: 4.58 UIU/ML (ref 0.38–5.33)
UIBC SERPL-MCNC: 289 UG/DL (ref 110–370)
VIT B12 SERPL-MCNC: 492 PG/ML (ref 211–911)
WBC # BLD AUTO: 15.1 K/UL (ref 4.8–10.8)

## 2024-03-18 PROCEDURE — 36415 COLL VENOUS BLD VENIPUNCTURE: CPT

## 2024-03-18 PROCEDURE — 84439 ASSAY OF FREE THYROXINE: CPT

## 2024-03-18 PROCEDURE — 85025 COMPLETE CBC W/AUTO DIFF WBC: CPT

## 2024-03-18 PROCEDURE — 82728 ASSAY OF FERRITIN: CPT

## 2024-03-18 PROCEDURE — 83540 ASSAY OF IRON: CPT

## 2024-03-18 PROCEDURE — 86800 THYROGLOBULIN ANTIBODY: CPT

## 2024-03-18 PROCEDURE — 82607 VITAMIN B-12: CPT

## 2024-03-18 PROCEDURE — 84443 ASSAY THYROID STIM HORMONE: CPT

## 2024-03-18 PROCEDURE — 86376 MICROSOMAL ANTIBODY EACH: CPT

## 2024-03-18 PROCEDURE — 80053 COMPREHEN METABOLIC PANEL: CPT

## 2024-03-18 PROCEDURE — 83036 HEMOGLOBIN GLYCOSYLATED A1C: CPT

## 2024-03-18 PROCEDURE — 83550 IRON BINDING TEST: CPT

## 2024-03-18 PROCEDURE — 84480 ASSAY TRIIODOTHYRONINE (T3): CPT

## 2024-03-19 LAB — THYROGLOB AB SERPL-ACNC: 8.3 IU/ML (ref 0–4)

## 2024-03-20 ENCOUNTER — HOSPITAL ENCOUNTER (OUTPATIENT)
Facility: MEDICAL CENTER | Age: 34
End: 2024-03-20
Attending: NURSE PRACTITIONER
Payer: COMMERCIAL

## 2024-03-20 ENCOUNTER — OFFICE VISIT (OUTPATIENT)
Dept: MEDICAL GROUP | Facility: MEDICAL CENTER | Age: 34
End: 2024-03-20
Payer: COMMERCIAL

## 2024-03-20 VITALS
BODY MASS INDEX: 46.63 KG/M2 | SYSTOLIC BLOOD PRESSURE: 110 MMHG | HEIGHT: 59 IN | HEART RATE: 89 BPM | OXYGEN SATURATION: 99 % | WEIGHT: 231.31 LBS | DIASTOLIC BLOOD PRESSURE: 80 MMHG | TEMPERATURE: 97.7 F

## 2024-03-20 DIAGNOSIS — R60.0 LIPEDEMA OF LOWER EXTREMITY: ICD-10-CM

## 2024-03-20 DIAGNOSIS — Z79.899 ON STIMULANT MEDICATION: ICD-10-CM

## 2024-03-20 DIAGNOSIS — E66.01 MORBID OBESITY WITH BMI OF 45.0-49.9, ADULT (HCC): ICD-10-CM

## 2024-03-20 PROBLEM — R76.8 THYROGLOBULIN ANTIBODY POSITIVE: Status: ACTIVE | Noted: 2024-03-20

## 2024-03-20 PROBLEM — R76.8 ANTI-TPO ANTIBODIES PRESENT: Status: ACTIVE | Noted: 2024-03-20

## 2024-03-20 PROCEDURE — 3074F SYST BP LT 130 MM HG: CPT | Performed by: NURSE PRACTITIONER

## 2024-03-20 PROCEDURE — 99214 OFFICE O/P EST MOD 30 MIN: CPT | Performed by: NURSE PRACTITIONER

## 2024-03-20 PROCEDURE — 80307 DRUG TEST PRSMV CHEM ANLYZR: CPT

## 2024-03-20 PROCEDURE — 3079F DIAST BP 80-89 MM HG: CPT | Performed by: NURSE PRACTITIONER

## 2024-03-20 RX ORDER — PHENTERMINE HYDROCHLORIDE 37.5 MG/1
37.5 CAPSULE ORAL EVERY MORNING
Qty: 30 CAPSULE | Refills: 0 | Status: SHIPPED | OUTPATIENT
Start: 2024-03-20 | End: 2024-04-19

## 2024-03-20 ASSESSMENT — FIBROSIS 4 INDEX: FIB4 SCORE: 0.27

## 2024-03-20 NOTE — PROGRESS NOTES
"Subjective:     History of Present Illness  The patient presents for evaluation of multiple medical concerns.    She is interested in trying weight loss medication before considering surgery. Her diet is average. She does not move as much during nighttime and tends to eat just 1 big meal. She works night shifts and has gained weight. She does not eat before going to work. She eats fast around 10 PM. Her household has protein, meat, and rice. She eats vegetables 3 times a week. She is trying to cut down on carbs. She sometimes eats something before going to bed. She switches back to daytime schedule on her days off. She does not eat much during the day. She drinks 1 to 2 cups of coffee with sweetened creamer. She is not diabetic. She has not been sick recently, but has seasonal allergies. She takes multivitamins. She does not eat yogurt, but uses butter. She eats pasta once in a while. She does stairs at work on her 15-minute break.      Current medicines (including changes today)  Current Outpatient Medications   Medication Sig Dispense Refill    phentermine 37.5 MG capsule Take 1 Capsule by mouth every morning for 30 days. 30 Capsule 0    albuterol 108 (90 Base) MCG/ACT Aero Soln inhalation aerosol Inhale 2 Puffs every 6 hours as needed for Shortness of Breath. 8.5 g 0     No current facility-administered medications for this visit.     She  has a past medical history of H/O bladder infections (2016) and Migraine (2016).    ROS   No chest pain, no shortness of breath, no abdominal pain  Positive ROS as per HPI.  All other systems reviewed and are negative.     Objective:     /80 (BP Location: Right arm, Patient Position: Sitting, BP Cuff Size: Adult)   Pulse 89   Temp 36.5 °C (97.7 °F) (Temporal)   Ht 1.5 m (4' 11.06\")   Wt 105 kg (231 lb 5 oz)   SpO2 99%  Body mass index is 46.63 kg/m².   Physical Exam    Constitutional: Alert, no distress.  Skin: Warm, dry, good turgor, no rashes in visible " areas.  Eye: Equal, round and reactive, conjunctiva clear, lids normal.  ENMT: Lips without lesions, good dentition  Respiratory: Unlabored respiratory effort  Psych: Alert and oriented x3, normal affect and mood.    Results  Laboratory Studies  Labs were reviewed with the patient.    Imaging  EKG was reviewed with the patient.        Assessment and Plan:   The following treatment plan was discussed    Assessment & Plan      1. Morbid obesity with BMI of 45.0-49.9, adult (HCC)  Unstable.   We discussed diet changes and increasing exercise. We discussed medication options today. She would like to start weight loss medication to aid in lifestyle changes. I will start phentermine 37.5 mg daily.  reviewed without any concerns. UDS done in office today. Controlled substance agreement signed. EKG shows normal sinus rhythm without concerns for starting stimulant medication.  - phentermine 37.5 MG capsule; Take 1 Capsule by mouth every morning for 30 days.  Dispense: 30 Capsule; Refill: 0    2. Lipedema of lower extremity  Unstable, cause unknown. We discussed anti-inflammatory diet. She will work on weight loss as above. Liposuction often is treatment option. She would like to avoid this if possible.  - phentermine 37.5 MG capsule; Take 1 Capsule by mouth every morning for 30 days.  Dispense: 30 Capsule; Refill: 0    3. On stimulant medication  - Controlled Substance Treatment Agreement  - EKG - Clinic Performed  - DRUG PROFILE, UR, 9 DRUGS      Follow-up  The patient will follow up in 1 month for medication refill and weight management.      The MA is performing the above orders under the direction of Dr. Perera    Please note that this dictation was created using voice recognition software. I have made every reasonable attempt to correct obvious errors, but I expect that there are errors of grammar and possibly content that I did not discover before finalizing the note.      Attestation      Verbal consent was  acquired by the patient to use [employer] ambient listening note generation during this visit Yes

## 2024-03-20 NOTE — PATIENT INSTRUCTIONS
Anti-Inflammatory diet:    Limiting Carbs and sugar  Limit dairy-almond or coconut milk  Limit Gluten containing foods (breads, pasta)

## 2024-03-22 LAB
AMPHET CTO UR CFM-MCNC: NEGATIVE NG/ML
BARBITURATES CTO UR CFM-MCNC: NEGATIVE NG/ML
BENZODIAZ CTO UR CFM-MCNC: NEGATIVE NG/ML
CANNABINOIDS CTO UR CFM-MCNC: NEGATIVE NG/ML
COCAINE CTO UR CFM-MCNC: NEGATIVE NG/ML
CREAT UR-MCNC: 240.9 MG/DL (ref 20–400)
DRUG COMMENT 753798: NORMAL
METHADONE CTO UR CFM-MCNC: NEGATIVE NG/ML
OPIATES CTO UR CFM-MCNC: NEGATIVE NG/ML
PCP CTO UR CFM-MCNC: NEGATIVE NG/ML
PROPOXYPH CTO UR CFM-MCNC: NEGATIVE NG/ML

## 2024-04-19 ENCOUNTER — OFFICE VISIT (OUTPATIENT)
Dept: MEDICAL GROUP | Facility: MEDICAL CENTER | Age: 34
End: 2024-04-19
Payer: COMMERCIAL

## 2024-04-19 VITALS
DIASTOLIC BLOOD PRESSURE: 82 MMHG | HEART RATE: 109 BPM | RESPIRATION RATE: 14 BRPM | OXYGEN SATURATION: 97 % | BODY MASS INDEX: 45.2 KG/M2 | SYSTOLIC BLOOD PRESSURE: 106 MMHG | WEIGHT: 224.2 LBS | HEIGHT: 59 IN | TEMPERATURE: 97.2 F

## 2024-04-19 DIAGNOSIS — R73.01 ELEVATED FASTING GLUCOSE: ICD-10-CM

## 2024-04-19 DIAGNOSIS — E66.01 MORBID OBESITY WITH BMI OF 45.0-49.9, ADULT (HCC): ICD-10-CM

## 2024-04-19 DIAGNOSIS — R76.8 ANTI-TPO ANTIBODIES PRESENT: ICD-10-CM

## 2024-04-19 DIAGNOSIS — R76.8 THYROGLOBULIN ANTIBODY POSITIVE: ICD-10-CM

## 2024-04-19 DIAGNOSIS — R63.5 WEIGHT GAIN: ICD-10-CM

## 2024-04-19 PROCEDURE — 99214 OFFICE O/P EST MOD 30 MIN: CPT | Performed by: NURSE PRACTITIONER

## 2024-04-19 PROCEDURE — 3074F SYST BP LT 130 MM HG: CPT | Performed by: NURSE PRACTITIONER

## 2024-04-19 PROCEDURE — 3079F DIAST BP 80-89 MM HG: CPT | Performed by: NURSE PRACTITIONER

## 2024-04-19 RX ORDER — PHENTERMINE HYDROCHLORIDE 15 MG/1
15 CAPSULE ORAL EVERY MORNING
Qty: 30 CAPSULE | Refills: 0 | Status: SHIPPED | OUTPATIENT
Start: 2024-04-23 | End: 2024-05-23

## 2024-04-19 ASSESSMENT — FIBROSIS 4 INDEX: FIB4 SCORE: 0.27

## 2024-04-19 NOTE — PROGRESS NOTES
"Subjective:     History of Present Illness  The patient presents for follow-up after starting phentermine.    The patient commenced phentermine therapy on 04/04/2024, which she reports as being highly effective. However, she refrains from daily intake due to associated side effects such as headaches, insomnia, and nausea. She denies experiencing anxiety as a side effect. Her current regimen includes taking it on her off days, which provides her with 2 to 3 days. This regimen also aids in intermittent fasting, during which she experiences minimal cravings and appetite. She expresses concern about the appropriateness of adhering to the medication schedule, as she experiences a resurgence of symptoms if she does not take it daily. Concurrently, she reports an improvement in her mood, energy levels, and a more positive demeanor, attributing this to the medication. Her last dose of phentermine was taken this morning. She has eliminated coffee from her diet and is making efforts towards an anti-inflammatory diet.      Current medicines (including changes today)  Current Outpatient Medications   Medication Sig Dispense Refill    [START ON 4/23/2024] phentermine 15 MG capsule Take 1 Capsule by mouth every morning for 30 days. 30 Capsule 0    phentermine 37.5 MG capsule Take 1 Capsule by mouth every morning for 30 days. 30 Capsule 0    albuterol 108 (90 Base) MCG/ACT Aero Soln inhalation aerosol Inhale 2 Puffs every 6 hours as needed for Shortness of Breath. 8.5 g 0     No current facility-administered medications for this visit.     She  has a past medical history of H/O bladder infections (2016) and Migraine (2016).    ROS   No chest pain, no shortness of breath, no abdominal pain  Positive ROS as per HPI.  All other systems reviewed and are negative.     Objective:     /82   Pulse (!) 109   Temp 36.2 °C (97.2 °F) (Temporal)   Resp 14   Ht 1.499 m (4' 11\")   Wt 102 kg (224 lb 3.2 oz)   SpO2 97%  Body mass index " is 45.28 kg/m².   Physical Exam    Constitutional: Alert, no distress.  Skin: Warm, dry, good turgor, no rashes in visible areas.  Eye: Equal, round and reactive, conjunctiva clear, lids normal.  ENMT: Lips without lesions, good dentition, oropharynx clear.  Neck: Trachea midline, no masses, no thyromegaly. No cervical or supraclavicular lymphadenopathy  Respiratory: Unlabored respiratory effort  Psych: Alert and oriented x3, normal affect and mood.      Results  Laboratory Studies  Fasting blood sugar was a little high. Average blood sugars were borderline.        Assessment and Plan:   The following treatment plan was discussed    Assessment & Plan  1. Morbid obesity.  The patient's condition remains unstable, albeit showing signs of improvement. The patient has reported experiencing side effects with the increased dosage of phentermine. Consequently, I have decided to reduce the dosage to 15 mg daily to assess her tolerance to this dosage. She is advised to continue with her dietary modifications and regular exercise regimen.    Follow-up  The patient is scheduled for a follow-up visit in 1 month for her annual examination and weight management.      ORDERS:  1. Morbid obesity with BMI of 45.0-49.9, adult (HCC)  - phentermine 15 MG capsule; Take 1 Capsule by mouth every morning for 30 days.  Dispense: 30 Capsule; Refill: 0    2. Anti-TPO antibodies present  - TSH; Future  - FREE THYROXINE; Future  - THYROID PEROXIDASE  (TPO) AB; Future    3. Thyroglobulin antibody positive  - TSH; Future  - FREE THYROXINE; Future  - ANTITHYROGLOBULIN AB; Future    4. Weight gain  - TSH; Future  - FREE THYROXINE; Future  - THYROID PEROXIDASE  (TPO) AB; Future  - ANTITHYROGLOBULIN AB; Future    5. Elevated fasting glucose  - HEMOGLOBIN A1C; Future          The MA is performing the above orders under the direction of Dr. Perera    Please note that this dictation was created using voice recognition software. I have made every  reasonable attempt to correct obvious errors, but I expect that there are errors of grammar and possibly content that I did not discover before finalizing the note.      Attestation      Verbal consent was acquired by the patient to use [employer] ambient listening note generation during this visit Yes

## 2025-04-21 ENCOUNTER — OFFICE VISIT (OUTPATIENT)
Dept: URGENT CARE | Facility: PHYSICIAN GROUP | Age: 35
End: 2025-04-21
Payer: COMMERCIAL

## 2025-04-21 VITALS
RESPIRATION RATE: 16 BRPM | WEIGHT: 220 LBS | DIASTOLIC BLOOD PRESSURE: 77 MMHG | HEART RATE: 74 BPM | OXYGEN SATURATION: 100 % | SYSTOLIC BLOOD PRESSURE: 132 MMHG | BODY MASS INDEX: 44.35 KG/M2 | HEIGHT: 59 IN | TEMPERATURE: 98 F

## 2025-04-21 DIAGNOSIS — J40 BRONCHITIS: ICD-10-CM

## 2025-04-21 PROCEDURE — 99213 OFFICE O/P EST LOW 20 MIN: CPT

## 2025-04-21 PROCEDURE — 3078F DIAST BP <80 MM HG: CPT

## 2025-04-21 PROCEDURE — 3075F SYST BP GE 130 - 139MM HG: CPT

## 2025-04-21 RX ORDER — ALBUTEROL SULFATE 90 UG/1
2 INHALANT RESPIRATORY (INHALATION) EVERY 6 HOURS PRN
Qty: 8.5 G | Refills: 0 | Status: SHIPPED | OUTPATIENT
Start: 2025-04-21

## 2025-04-21 RX ORDER — METHYLPREDNISOLONE 4 MG/1
TABLET ORAL
Qty: 21 TABLET | Refills: 0 | Status: SHIPPED | OUTPATIENT
Start: 2025-04-21

## 2025-04-21 RX ORDER — ALBUTEROL SULFATE 90 UG/1
2 INHALANT RESPIRATORY (INHALATION) EVERY 6 HOURS PRN
Qty: 8.5 G | Refills: 0 | Status: SHIPPED | OUTPATIENT
Start: 2025-04-21 | End: 2025-04-21

## 2025-04-21 RX ORDER — BENZONATATE 100 MG/1
100 CAPSULE ORAL 3 TIMES DAILY PRN
Qty: 60 CAPSULE | Refills: 0 | Status: SHIPPED | OUTPATIENT
Start: 2025-04-21

## 2025-04-21 ASSESSMENT — ENCOUNTER SYMPTOMS
DIARRHEA: 0
DIZZINESS: 0
NECK PAIN: 0
SPUTUM PRODUCTION: 0
DOUBLE VISION: 0
HEADACHES: 1
STRIDOR: 0
SHORTNESS OF BREATH: 0
NAUSEA: 0
WHEEZING: 1
EYE DISCHARGE: 0
ABDOMINAL PAIN: 0
SINUS PAIN: 0
WEAKNESS: 0
BLURRED VISION: 0
SORE THROAT: 1
FOCAL WEAKNESS: 0
PHOTOPHOBIA: 0
CHILLS: 1
MYALGIAS: 1
VOMITING: 0
EYE REDNESS: 0
EYE PAIN: 0
COUGH: 1

## 2025-04-21 ASSESSMENT — VISUAL ACUITY: OU: 1

## 2025-04-21 ASSESSMENT — FIBROSIS 4 INDEX: FIB4 SCORE: 0.28

## 2025-04-21 NOTE — ASSESSMENT & PLAN NOTE
Orders:    albuterol 108 (90 Base) MCG/ACT Aero Soln inhalation aerosol; Inhale 2 Puffs every 6 hours as needed for Shortness of Breath.

## 2025-04-21 NOTE — LETTER
[facility] [place]  [address]  [place] [address]     April 21, 2025    Patient: Nicole Fishman   YOB: 1990   Date of Visit: 4/21/2025       To Whom It May Concern:    Nicole Fishman was seen and treated in our department on 4/21/2025.     Please excuse Nicole from work 4/20/25-4/23/25.           Sincerely,     MATIAS King.

## 2025-04-21 NOTE — PROGRESS NOTES
Subjective     [facility] 34 [facility] who presents with Cough (Coughing  on and off fever and Needs a Doctor Note please )    HPI:   Nicole is a 33yo female presenting for sore throat and cough x3 days. Pertinent history of asthma. Reports associated wheezing, intermittent headache, and chills. Denies abdominal pain, vomiting, or diarrhea. Denies shortness of breath or increased work of breathing. Denies dysphagia or difficulty managing oral secretions. She has attempted OTC cough medication and Tylenol for relief.       Review of Systems   Constitutional:  Positive for chills. Negative for malaise/fatigue.   HENT:  Positive for congestion and sore throat. Negative for ear discharge, ear pain and sinus pain.    Eyes:  Negative for blurred vision, double vision, photophobia, pain, discharge and redness.   Respiratory:  Positive for cough and wheezing. Negative for sputum production, shortness of breath and stridor.    Gastrointestinal:  Negative for abdominal pain, diarrhea, nausea and vomiting.   Musculoskeletal:  Positive for myalgias. Negative for neck pain.   Skin:  Negative for rash.   Neurological:  Positive for headaches (intermittent). Negative for dizziness, focal weakness and weakness.     Past Medical History:   Diagnosis Date    H/O bladder infections 2016    Migraine 2016     Past Surgical History:   Procedure Laterality Date    PRIMARY C SECTION  2017    Procedure: PRIMARY C SECTION;  Surgeon: Marisela Romero M.D.;  Location: LABOR AND DELIVERY;  Service:      Patient has no known allergies.     Current Outpatient Medications:     albuterol 108 (90 Base) MCG/ACT Aero Soln inhalation aerosol, Inhale 2 Puffs every 6 hours as needed for Shortness of Breath., Disp: 8.5 g, Rfl: 0     Social History     Tobacco Use    Smoking status: Former     Current packs/day: 0.00     Types: Cigarettes     Quit date:      Years since quittin.3    Smokeless tobacco: Never     "Tobacco comments:     occasionally   Vaping Use    Vaping status: Never Used   Substance Use Topics    Alcohol use: Yes     Comment: socially    Drug use: No     Family History   Problem Relation Age of Onset    Hypertension Mother     Diabetes Mother     Other Mother         gout    Thyroid Mother     Hypertension Father     Diabetes Father     Other Father         gout    Heart Disease Maternal Grandmother      Medications, Allergies, and current problem list reviewed today in Epic.      Objective     /77 (BP Location: Left arm, Patient Position: Sitting, BP Cuff Size: Large adult)   Pulse 74   Temp 36.7 °C (98 °F) (Temporal)   Resp 16   Ht 1.499 m (4' 11\")   Wt 99.8 kg (220 lb)   SpO2 100%   BMI 44.43 kg/m²      Physical Exam  Vitals reviewed.   Constitutional:       General: She is not in acute distress.  HENT:      Head: No right periorbital erythema or left periorbital erythema.      Jaw: There is normal jaw occlusion. No tenderness, swelling, pain on movement or malocclusion.      Right Ear: Tympanic membrane, ear canal and external ear normal.      Left Ear: Tympanic membrane, ear canal and external ear normal.      Nose: Congestion present.      Right Sinus: No maxillary sinus tenderness or frontal sinus tenderness.      Left Sinus: No maxillary sinus tenderness or frontal sinus tenderness.      Mouth/Throat:      Lips: No lesions.      Mouth: Mucous membranes are moist. No oral lesions or angioedema.      Tongue: No lesions. Tongue does not deviate from midline.      Palate: No mass and lesions.      Pharynx: Uvula midline. Posterior oropharyngeal erythema present. No oropharyngeal exudate or uvula swelling.      Tonsils: No tonsillar abscesses.   Eyes:      General: Vision grossly intact. Gaze aligned appropriately. No visual field deficit.     Extraocular Movements: Extraocular movements intact.      Conjunctiva/sclera: Conjunctivae normal.      Pupils: Pupils are equal, round, and reactive " to light.      Right eye: Pupil is round, reactive and not sluggish.      Left eye: Pupil is round, reactive and not sluggish.      Funduscopic exam:     Right eye: Red reflex present.         Left eye: Red reflex present.  Neck:      Trachea: Trachea normal. No abnormal tracheal secretions or tracheal deviation.   Cardiovascular:      Rate and Rhythm: Normal rate and regular rhythm.      Pulses: Normal pulses.      Heart sounds: Normal heart sounds.   Pulmonary:      Effort: Pulmonary effort is normal. No tachypnea, accessory muscle usage, prolonged expiration, respiratory distress or retractions.      Breath sounds: Normal breath sounds. No stridor, decreased air movement or transmitted upper airway sounds. No wheezing, rhonchi or rales.   Musculoskeletal:         General: Normal range of motion.      Cervical back: Full passive range of motion without pain, normal range of motion and neck supple. No erythema, rigidity, tenderness or crepitus. No pain with movement. Normal range of motion.   Lymphadenopathy:      Cervical: No cervical adenopathy.   Skin:     General: Skin is warm and dry.      Findings: No rash.   Neurological:      Mental Status: She is alert. Mental status is at baseline.      Sensory: Sensation is intact.      Motor: Motor function is intact.      Coordination: Coordination is intact.      Gait: Gait is intact.   Psychiatric:         Mood and Affect: Mood normal.         Behavior: Behavior normal.         Thought Content: Thought content normal.       Assessment & Plan    1. Bronchitis   - albuterol 108 (90 Base) MCG/ACT Aero Soln inhalation aerosol; Inhale 2 Puffs every 6 hours as needed for Shortness of Breath.  Dispense: 8.5 g; Refill: 0  - methylPREDNISolone (MEDROL DOSEPAK) 4 MG Tablet Therapy Pack; Follow schedule on package instructions.  Dispense: 21 Tablet; Refill: 0  - benzonatate (TESSALON) 100 MG Cap; Take 1 Capsule by mouth 3 times a day as needed for Cough.  Dispense: 60 Capsule;  Refill: 0       MDM/Comments:   History and examination most consistent with viral URI.   No signs of bacterial infection on exam.    Patient declines POCT viral testing.      Encouraged conservative treatment.    Advised patient symptoms are viral in etiology, recommended supportive care. Increased fluids and rest.  Recommended over-the-counter cold and flu medications and Tylenol for symptomatic relief and fevers.  Discussed use of nedi-pot, humidifier, and Flonase nasal spray as well.  Discussed good hand hygiene and ways to decrease spread of disease.  Follow-up with PCP return for reevaluation if symptoms persist/worsen. The patient demonstrated a good understanding and agreed with the treatment plan.        Illness progression and alarm symptoms discussed with patient, emphasizing low threshold for returning to clinic/emergency department for worsening symptoms. Patient is agreeable to the plan and verbalizes understanding, and will follow up if warranted.        Differential diagnosis, supportive care, and indications for immediate follow-up discussed with patient.  Instructed to return to clinic or nearest emergency department for any change in condition, further concerns, or worsening of symptoms.     Recommended supportive treatment at home:     - Use a humidifier, especially at night. Cold or warm water humidifiers have the same effect.  - Mor Med squeeze bottle sinus rinses or plain nasal saline twice a day.  - Hot tea + honey + fresh lemon juice  - Frequent hand washing, rest, hydration  - Warm salt water gargles at least twice a day       Follow up with primary care provider. Follow up urgently for worsening symptoms, persistent fevers, facial swelling, visual changes, weakness, elevated heart rate, stiff neck, prolonged cough, persistent wheezing, leg swelling, or any other concerns. Seek emergency medical care immediately for: Trouble breathing, persistent pain or pressure in the chest, confusion,  inability to wake or stay awake, bluish lips or face, persistent tachycardia (fast heart rate), prolonged dizziness, persistent high grade fevers.                                                     Electronically signed by SHON Espinoza

## 2025-05-01 ENCOUNTER — APPOINTMENT (OUTPATIENT)
Dept: MEDICAL GROUP | Facility: IMAGING CENTER | Age: 35
End: 2025-05-01
Payer: COMMERCIAL

## 2025-05-01 VITALS
BODY MASS INDEX: 45.56 KG/M2 | TEMPERATURE: 98 F | DIASTOLIC BLOOD PRESSURE: 80 MMHG | WEIGHT: 226 LBS | SYSTOLIC BLOOD PRESSURE: 114 MMHG | HEIGHT: 59 IN | OXYGEN SATURATION: 99 % | HEART RATE: 98 BPM | RESPIRATION RATE: 16 BRPM

## 2025-05-01 DIAGNOSIS — J40 BRONCHITIS: ICD-10-CM

## 2025-05-01 DIAGNOSIS — Z02.89 ENCOUNTER FOR COMPLETION OF FORM WITH PATIENT: ICD-10-CM

## 2025-05-01 DIAGNOSIS — J45.21 MILD INTERMITTENT ASTHMA WITH ACUTE EXACERBATION: ICD-10-CM

## 2025-05-01 PROCEDURE — 99214 OFFICE O/P EST MOD 30 MIN: CPT | Performed by: STUDENT IN AN ORGANIZED HEALTH CARE EDUCATION/TRAINING PROGRAM

## 2025-05-01 PROCEDURE — 3079F DIAST BP 80-89 MM HG: CPT | Performed by: STUDENT IN AN ORGANIZED HEALTH CARE EDUCATION/TRAINING PROGRAM

## 2025-05-01 PROCEDURE — 3074F SYST BP LT 130 MM HG: CPT | Performed by: STUDENT IN AN ORGANIZED HEALTH CARE EDUCATION/TRAINING PROGRAM

## 2025-05-01 PROCEDURE — 7101 PR PHYSICAL: Performed by: STUDENT IN AN ORGANIZED HEALTH CARE EDUCATION/TRAINING PROGRAM

## 2025-05-01 ASSESSMENT — PATIENT HEALTH QUESTIONNAIRE - PHQ9: CLINICAL INTERPRETATION OF PHQ2 SCORE: 0

## 2025-05-01 ASSESSMENT — FIBROSIS 4 INDEX: FIB4 SCORE: 0.28

## 2025-05-01 NOTE — PROGRESS NOTES
Subjective:     CC:   Chief Complaint   Patient presents with    Paperwork     FMLA Asthma, previously had fmla for this        HPI:     Verbal consent was acquired by the patient to use [employer] ambient listening note generation during this visit Yes      Nicole Arreolajaime Edwards, 34 y.o., female is new to me and  presents today to discuss:     History of Present Illness    Cough and asthma  She is under the care of Beronica Castelan. She sought medical attention at an urgent care facility on 04/21/2025 due to acough that commenced on 04/18/2025. Despite completing the prescribed course of benzonatate and corticosteroids, her symptoms have persisted. No recent antibiotic use is reported. She denies experiencing fever or chills but notes feeling cold during severe coughing episodes, which prevent her from lying supine. Myalgia is reported, attributed to the physical strain of suppressing her cough. She has a history of asthma, which is generally well-managed with albuterol. Due to the current cough, albuterol has been required every other day, particularly nocturnally when symptoms exacerbate. The FMLA from the previous year was not renewed as she believed her asthma was improving. The condition is intermittent, with exacerbations triggered by specific factors. She has been absent from work since 04/28/2025. 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 She experiences tremors after using albuterol, impairing her ability to drive. No scheduled follow-ups with Beronica are currently in place. She does not consult any specialist for her asthma. She is requesting completion of FMLA.            ROS:  See HPI    Medications, allergies, past medical history, family history, surgical history, and social history documented in chart and reviewed by me.       Objective:   Exam:  /80 (BP Location: Left arm, Patient Position: Sitting, BP Cuff Size: Large adult)  "  Pulse 98   Temp 36.7 °C (98 °F) (Temporal)   Resp 16   Ht 1.499 m (4' 11\")   Wt 103 kg (226 lb)   LMP 04/23/2025 (Approximate)   SpO2 99%   BMI 45.65 kg/m²      Physical Exam  Vitals reviewed.   Constitutional:       General: She is not in acute distress.     Appearance: Normal appearance. She is ill-appearing.   HENT:      Head: Normocephalic and atraumatic.      Right Ear: Tympanic membrane, ear canal and external ear normal. There is no impacted cerumen.      Left Ear: Tympanic membrane, ear canal and external ear normal. There is no impacted cerumen.      Nose: Congestion present.      Mouth/Throat:      Mouth: Mucous membranes are moist.      Pharynx: Oropharynx is clear. No oropharyngeal exudate or posterior oropharyngeal erythema.   Eyes:      General: No scleral icterus.        Right eye: No discharge.         Left eye: No discharge.      Conjunctiva/sclera: Conjunctivae normal.   Neck:      Thyroid: No thyroid mass or thyromegaly.   Cardiovascular:      Rate and Rhythm: Normal rate and regular rhythm.      Pulses: Normal pulses.      Heart sounds: Normal heart sounds. No murmur heard.  Pulmonary:      Effort: Pulmonary effort is normal. No respiratory distress.      Breath sounds: Normal breath sounds. No wheezing.   Musculoskeletal:         General: No swelling, tenderness or deformity. Normal range of motion.      Cervical back: Normal range of motion and neck supple.      Right lower leg: No edema.      Left lower leg: No edema.   Lymphadenopathy:      Cervical: No cervical adenopathy.   Skin:     General: Skin is warm and dry.      Coloration: Skin is not jaundiced.      Findings: No bruising, erythema, lesion or rash.   Neurological:      General: No focal deficit present.      Mental Status: She is alert and oriented to person, place, and time.      Motor: No weakness.      Coordination: Coordination normal.      Gait: Gait normal.   Psychiatric:         Mood and Affect: Mood normal.         " Behavior: Behavior normal.         Thought Content: Thought content normal.         Judgment: Judgment normal.              Assessment & Plan:       Assessment & Plan      1. Mild intermittent asthma with acute exacerbation  Chronic, established condition with acute exacerbation.  - The patient's asthma is generally well-controlled with albuterol, but she has been using it more frequently due to the current cough. She reports using albuterol every other day, especially at night when symptoms worsen.  - No recent follow-ups with a specialist for asthma management were reported.  - An FMLA form has been completed to accommodate her need for intermittent leave due to asthma flare-ups. She is advised to monitor her symptoms closely and to use albuterol as needed.  - She is advised to schedule a follow-up appointment with PCP.  - amoxicillin-clavulanate (AUGMENTIN) 875-125 MG Tab; Take 1 Tablet by mouth 2 times a day for 5 days.  Dispense: 10 Tablet; Refill: 0    2. Bronchitis  Subacute. Unstable.   - The patient has been experiencing a persistent cough since 04/18/2025, which has not improved despite completing a course of benzonatate and steroids prescribed by urgent care.  -Will trial Augmentin 875/125 twice daily for 5 days.  - Physical examination revealed clear lung sounds, and no fever or chills were reported.  -Recommend to return to clinic if symptoms do not resolve.  - amoxicillin-clavulanate (AUGMENTIN) 875-125 MG Tab; Take 1 Tablet by mouth 2 times a day for 5 days.  Dispense: 10 Tablet; Refill: 0    3. Encounter for completion of form with patient  FMLA was completed today as requested by patient.  See scanned copies in the media.             Diagnosis and treatment plan explained to pt. Counseled pt on new medication(s) and potential side effects. Pt agreed with treatment plan and verbalized understanding.     Return in about 3 months (around 8/1/2025) for Asthma follow-up or sooner if needed.     Please  note that this dictation was created using voice recognition software. I have made every reasonable attempt to correct obvious errors, but I expect that there are errors of grammar and possibly content that I did not discover before finalizing the note.    Erin Clark PA-C  [facility]

## 2025-05-02 NOTE — PATIENT INSTRUCTIONS
Thank you for choosing [facility]. It was a pleasure meeting you today.     Take care!  Erin [facility]

## 2025-07-17 ENCOUNTER — APPOINTMENT (OUTPATIENT)
Dept: MEDICAL GROUP | Facility: IMAGING CENTER | Age: 35
End: 2025-07-17
Payer: COMMERCIAL

## 2025-08-21 ENCOUNTER — APPOINTMENT (OUTPATIENT)
Dept: MEDICAL GROUP | Facility: IMAGING CENTER | Age: 35
End: 2025-08-21
Payer: COMMERCIAL

## (undated) DEVICE — WATER IRRIG. STER. 1500 ML - (9/CA)

## (undated) DEVICE — GLOVE BIOGEL SZ 6 PF LATEX - (50EA/BX 4BX/CA)

## (undated) DEVICE — TAPE CLOTH MEDIPORE 6 INCH - (12RL/CA)

## (undated) DEVICE — SET EXTENSION WITH 2 PORTS (48EA/CA) ***PART #2C8610 IS A SUBSTITUTE*****

## (undated) DEVICE — TUBING CLEARLINK DUO-VENT - C-FLO (48EA/CA)

## (undated) DEVICE — SUTUREABS02-0 CT1 27IN - (36EA/BX)

## (undated) DEVICE — HEAD HOLDER JUNIOR/ADULT

## (undated) DEVICE — SUTURE 0 VICRYL PLUS CT-1 - 36 INCH (36/BX)

## (undated) DEVICE — TRAY SPINAL ANESTHESIA NON-SAFETY (10/CA)

## (undated) DEVICE — PACK C-SECTION (2EA/CA)

## (undated) DEVICE — SUTURE 1 CHROMIC GUTCT-1 27 (36PK/BX)"

## (undated) DEVICE — KIT  I.V. START (100EA/CA)

## (undated) DEVICE — STAPLER SKIN DISP - (6/BX 10BX/CA) VISISTAT

## (undated) DEVICE — DRESSING NON-ADHERING 8 X 3 - (50/BX)

## (undated) DEVICE — DETERGENT RENUZYME PLUS 10 OZ PACKET (50/BX)

## (undated) DEVICE — ELECTRODE DUAL RETURN W/ CORD - (50/PK)

## (undated) DEVICE — CATHETER IV NON-SAFETY 18 GA X 1 1/4 (50/BX 4BX/CA)

## (undated) DEVICE — SPONGE GAUZESTER 4 X 4 4PLY - (128PK/CA)

## (undated) DEVICE — SODIUM CHL IRRIGATION 0.9% 1000ML (12EA/CA)